# Patient Record
Sex: FEMALE | Race: WHITE | Employment: UNEMPLOYED
[De-identification: names, ages, dates, MRNs, and addresses within clinical notes are randomized per-mention and may not be internally consistent; named-entity substitution may affect disease eponyms.]

---

## 2017-01-13 ENCOUNTER — OFFICE VISIT (OUTPATIENT)
Dept: FAMILY MEDICINE CLINIC | Facility: CLINIC | Age: 57
End: 2017-01-13

## 2017-01-13 VITALS
SYSTOLIC BLOOD PRESSURE: 138 MMHG | TEMPERATURE: 97.4 F | BODY MASS INDEX: 24.07 KG/M2 | DIASTOLIC BLOOD PRESSURE: 103 MMHG | HEIGHT: 64 IN | WEIGHT: 141 LBS | HEART RATE: 120 BPM

## 2017-01-13 DIAGNOSIS — Z12.31 ENCOUNTER FOR MAMMOGRAM TO ESTABLISH BASELINE MAMMOGRAM: ICD-10-CM

## 2017-01-13 DIAGNOSIS — S34.5XXS: ICD-10-CM

## 2017-01-13 DIAGNOSIS — Z13.9 SCREENING: ICD-10-CM

## 2017-01-13 DIAGNOSIS — J44.9 CHRONIC OBSTRUCTIVE PULMONARY DISEASE, UNSPECIFIED COPD TYPE (HCC): ICD-10-CM

## 2017-01-13 DIAGNOSIS — Z76.89 ENCOUNTER TO ESTABLISH CARE: Primary | ICD-10-CM

## 2017-01-13 PROBLEM — S34.5XXA: Status: ACTIVE | Noted: 2017-01-13

## 2017-01-13 PROCEDURE — 99204 OFFICE O/P NEW MOD 45 MIN: CPT | Performed by: NURSE PRACTITIONER

## 2017-01-13 RX ORDER — UBIDECARENONE 75 MG
50 CAPSULE ORAL DAILY
COMMUNITY
End: 2017-06-14 | Stop reason: ALTCHOICE

## 2017-01-13 ASSESSMENT — ENCOUNTER SYMPTOMS
RHINORRHEA: 0
COUGH: 0
SINUS PRESSURE: 0
ABDOMINAL PAIN: 0
DIARRHEA: 0
CONSTIPATION: 0
EYE DISCHARGE: 0
SHORTNESS OF BREATH: 0
BLOOD IN STOOL: 0
CHEST TIGHTNESS: 0

## 2017-01-13 ASSESSMENT — PATIENT HEALTH QUESTIONNAIRE - PHQ9
SUM OF ALL RESPONSES TO PHQ QUESTIONS 1-9: 2
2. FEELING DOWN, DEPRESSED OR HOPELESS: 1
1. LITTLE INTEREST OR PLEASURE IN DOING THINGS: 1
SUM OF ALL RESPONSES TO PHQ9 QUESTIONS 1 & 2: 2

## 2017-01-16 RX ORDER — TRAZODONE HYDROCHLORIDE 150 MG/1
TABLET ORAL
Qty: 30 TABLET | Refills: 1 | Status: SHIPPED | OUTPATIENT
Start: 2017-01-16 | End: 2017-02-22

## 2017-01-16 RX ORDER — GABAPENTIN 300 MG/1
300 CAPSULE ORAL 3 TIMES DAILY
Qty: 90 CAPSULE | Refills: 1 | Status: SHIPPED | OUTPATIENT
Start: 2017-01-16 | End: 2017-03-27 | Stop reason: SDUPTHER

## 2017-01-16 RX ORDER — CYCLOBENZAPRINE HCL 10 MG
TABLET ORAL
Qty: 30 TABLET | Refills: 0 | Status: SHIPPED | OUTPATIENT
Start: 2017-01-16 | End: 2017-02-11 | Stop reason: SDUPTHER

## 2017-01-16 RX ORDER — HYDROXYZINE PAMOATE 25 MG/1
25 CAPSULE ORAL 3 TIMES DAILY PRN
Qty: 30 CAPSULE | Refills: 2 | Status: SHIPPED | OUTPATIENT
Start: 2017-01-16 | End: 2017-03-27 | Stop reason: SDUPTHER

## 2017-01-27 ENCOUNTER — TELEPHONE (OUTPATIENT)
Dept: FAMILY MEDICINE CLINIC | Facility: CLINIC | Age: 57
End: 2017-01-27

## 2017-02-13 RX ORDER — CYCLOBENZAPRINE HCL 10 MG
TABLET ORAL
Qty: 30 TABLET | Refills: 0 | Status: SHIPPED | OUTPATIENT
Start: 2017-02-13 | End: 2017-02-23

## 2017-02-15 ENCOUNTER — TELEPHONE (OUTPATIENT)
Dept: FAMILY MEDICINE CLINIC | Facility: CLINIC | Age: 57
End: 2017-02-15

## 2017-02-22 ENCOUNTER — OFFICE VISIT (OUTPATIENT)
Dept: FAMILY MEDICINE CLINIC | Facility: CLINIC | Age: 57
End: 2017-02-22

## 2017-02-22 ENCOUNTER — HOSPITAL ENCOUNTER (OUTPATIENT)
Age: 57
Discharge: HOME | End: 2017-02-22
Payer: COMMERCIAL

## 2017-02-22 ENCOUNTER — HOSPITAL ENCOUNTER (OUTPATIENT)
Dept: GENERAL RADIOLOGY | Age: 57
Discharge: HOME | End: 2017-02-22
Payer: COMMERCIAL

## 2017-02-22 VITALS
BODY MASS INDEX: 22.74 KG/M2 | OXYGEN SATURATION: 94 % | HEIGHT: 64 IN | SYSTOLIC BLOOD PRESSURE: 111 MMHG | RESPIRATION RATE: 18 BRPM | WEIGHT: 133.2 LBS | HEART RATE: 113 BPM | DIASTOLIC BLOOD PRESSURE: 79 MMHG

## 2017-02-22 DIAGNOSIS — R10.11 RIGHT UPPER QUADRANT ABDOMINAL PAIN: ICD-10-CM

## 2017-02-22 DIAGNOSIS — R10.11 RIGHT UPPER QUADRANT ABDOMINAL PAIN: Primary | ICD-10-CM

## 2017-02-22 PROCEDURE — 99214 OFFICE O/P EST MOD 30 MIN: CPT | Performed by: NURSE PRACTITIONER

## 2017-02-22 PROCEDURE — 74000 XR ABDOMEN LIMITED (KUB): CPT

## 2017-02-22 ASSESSMENT — ENCOUNTER SYMPTOMS
EYE DISCHARGE: 0
CONSTIPATION: 0
SHORTNESS OF BREATH: 0
CHEST TIGHTNESS: 0
ABDOMINAL PAIN: 1
DIARRHEA: 0
BLOOD IN STOOL: 0
COUGH: 1
RHINORRHEA: 1
SINUS PRESSURE: 0

## 2017-02-22 ASSESSMENT — PATIENT HEALTH QUESTIONNAIRE - PHQ9
2. FEELING DOWN, DEPRESSED OR HOPELESS: 0
1. LITTLE INTEREST OR PLEASURE IN DOING THINGS: 0
SUM OF ALL RESPONSES TO PHQ QUESTIONS 1-9: 0
SUM OF ALL RESPONSES TO PHQ9 QUESTIONS 1 & 2: 0

## 2017-02-23 RX ORDER — CYCLOBENZAPRINE HCL 10 MG
TABLET ORAL
Qty: 30 TABLET | Refills: 0 | Status: SHIPPED | OUTPATIENT
Start: 2017-02-23 | End: 2017-03-07 | Stop reason: SDUPTHER

## 2017-03-06 ENCOUNTER — TELEPHONE (OUTPATIENT)
Dept: FAMILY MEDICINE CLINIC | Facility: CLINIC | Age: 57
End: 2017-03-06

## 2017-03-06 RX ORDER — CYCLOBENZAPRINE HCL 10 MG
TABLET ORAL
Qty: 30 TABLET | Refills: 0 | Status: CANCELLED | OUTPATIENT
Start: 2017-03-06

## 2017-03-07 RX ORDER — CYCLOBENZAPRINE HCL 10 MG
TABLET ORAL
Qty: 90 TABLET | Refills: 0 | Status: SHIPPED | OUTPATIENT
Start: 2017-03-07 | End: 2017-04-06 | Stop reason: SDUPTHER

## 2017-03-28 ENCOUNTER — TELEPHONE (OUTPATIENT)
Dept: FAMILY MEDICINE CLINIC | Age: 57
End: 2017-03-28

## 2017-03-28 RX ORDER — GABAPENTIN 300 MG/1
CAPSULE ORAL
Qty: 90 CAPSULE | Refills: 0 | Status: SHIPPED | OUTPATIENT
Start: 2017-03-28 | End: 2017-05-05 | Stop reason: SDUPTHER

## 2017-03-28 RX ORDER — HYDROXYZINE PAMOATE 25 MG/1
CAPSULE ORAL
Qty: 30 CAPSULE | Refills: 1 | Status: SHIPPED | OUTPATIENT
Start: 2017-03-28 | End: 2017-06-14 | Stop reason: SDUPTHER

## 2017-04-06 RX ORDER — CYCLOBENZAPRINE HCL 10 MG
TABLET ORAL
Qty: 90 TABLET | Refills: 0 | Status: SHIPPED | OUTPATIENT
Start: 2017-04-06 | End: 2017-05-10 | Stop reason: SDUPTHER

## 2017-04-11 ENCOUNTER — TELEPHONE (OUTPATIENT)
Dept: FAMILY MEDICINE CLINIC | Age: 57
End: 2017-04-11

## 2017-04-25 DIAGNOSIS — Z13.9 SCREENING: ICD-10-CM

## 2017-04-25 LAB
CONTROL: YES
HEMOCCULT STL QL: POSITIVE

## 2017-04-25 PROCEDURE — 82274 ASSAY TEST FOR BLOOD FECAL: CPT | Performed by: NURSE PRACTITIONER

## 2017-05-02 ENCOUNTER — TELEPHONE (OUTPATIENT)
Dept: FAMILY MEDICINE CLINIC | Age: 57
End: 2017-05-02

## 2017-05-02 DIAGNOSIS — R19.5 POSITIVE FIT (FECAL IMMUNOCHEMICAL TEST): Primary | ICD-10-CM

## 2017-05-09 ENCOUNTER — PATIENT MESSAGE (OUTPATIENT)
Dept: OTHER | Facility: CLINIC | Age: 57
End: 2017-05-09

## 2017-05-12 RX ORDER — CYCLOBENZAPRINE HCL 10 MG
TABLET ORAL
Qty: 90 TABLET | Refills: 0 | Status: SHIPPED | OUTPATIENT
Start: 2017-05-12 | End: 2017-06-14 | Stop reason: SDUPTHER

## 2017-05-12 RX ORDER — CHOLECALCIFEROL (VITAMIN D3) 25 MCG
1 CAPSULE ORAL DAILY
Qty: 30 CAPSULE | Refills: 5 | OUTPATIENT
Start: 2017-05-12

## 2017-05-12 RX ORDER — GABAPENTIN 300 MG/1
CAPSULE ORAL
Qty: 90 CAPSULE | Refills: 0 | Status: SHIPPED | OUTPATIENT
Start: 2017-05-12 | End: 2017-06-14 | Stop reason: SDUPTHER

## 2017-05-12 RX ORDER — UBIDECARENONE 75 MG
50 CAPSULE ORAL DAILY
Qty: 30 TABLET | Refills: 5 | OUTPATIENT
Start: 2017-05-12

## 2017-05-12 RX ORDER — TRAZODONE HYDROCHLORIDE 150 MG/1
TABLET ORAL
Qty: 30 TABLET | Refills: 0 | Status: SHIPPED | OUTPATIENT
Start: 2017-05-12 | End: 2017-06-14 | Stop reason: SDUPTHER

## 2017-05-24 ENCOUNTER — TELEPHONE (OUTPATIENT)
Dept: FAMILY MEDICINE CLINIC | Age: 57
End: 2017-05-24

## 2017-06-14 ENCOUNTER — OFFICE VISIT (OUTPATIENT)
Dept: FAMILY MEDICINE CLINIC | Age: 57
End: 2017-06-14
Payer: COMMERCIAL

## 2017-06-14 VITALS
TEMPERATURE: 98.6 F | WEIGHT: 135.2 LBS | HEIGHT: 64 IN | SYSTOLIC BLOOD PRESSURE: 130 MMHG | DIASTOLIC BLOOD PRESSURE: 80 MMHG | BODY MASS INDEX: 23.08 KG/M2 | HEART RATE: 153 BPM | OXYGEN SATURATION: 94 %

## 2017-06-14 DIAGNOSIS — J42 CHRONIC BRONCHITIS, UNSPECIFIED CHRONIC BRONCHITIS TYPE (HCC): ICD-10-CM

## 2017-06-14 DIAGNOSIS — R05.8 COUGH PRODUCTIVE OF PURULENT SPUTUM: ICD-10-CM

## 2017-06-14 DIAGNOSIS — Z76.0 MEDICATION REFILL: ICD-10-CM

## 2017-06-14 DIAGNOSIS — J44.1 COPD EXACERBATION (HCC): Primary | ICD-10-CM

## 2017-06-14 DIAGNOSIS — H10.33 ACUTE BACTERIAL CONJUNCTIVITIS OF BOTH EYES: ICD-10-CM

## 2017-06-14 PROCEDURE — 99214 OFFICE O/P EST MOD 30 MIN: CPT | Performed by: NURSE PRACTITIONER

## 2017-06-14 RX ORDER — POLYMYXIN B SULFATE AND TRIMETHOPRIM 1; 10000 MG/ML; [USP'U]/ML
1 SOLUTION OPHTHALMIC EVERY 4 HOURS
Qty: 1 BOTTLE | Refills: 0 | Status: SHIPPED | OUTPATIENT
Start: 2017-06-14 | End: 2017-06-24

## 2017-06-14 RX ORDER — TRAZODONE HYDROCHLORIDE 150 MG/1
TABLET ORAL
Qty: 30 TABLET | Refills: 5 | Status: SHIPPED | OUTPATIENT
Start: 2017-06-14 | End: 2017-10-11 | Stop reason: SDUPTHER

## 2017-06-14 RX ORDER — GABAPENTIN 300 MG/1
CAPSULE ORAL
Qty: 90 CAPSULE | Refills: 2 | Status: SHIPPED | OUTPATIENT
Start: 2017-06-14 | End: 2017-09-22 | Stop reason: SDUPTHER

## 2017-06-14 RX ORDER — CYCLOBENZAPRINE HCL 10 MG
TABLET ORAL
Qty: 90 TABLET | Refills: 5 | Status: SHIPPED | OUTPATIENT
Start: 2017-06-14 | End: 2017-11-01 | Stop reason: SDUPTHER

## 2017-06-14 RX ORDER — OMEPRAZOLE 20 MG/1
20 CAPSULE, DELAYED RELEASE ORAL DAILY
Qty: 30 CAPSULE | Refills: 5 | Status: SHIPPED | OUTPATIENT
Start: 2017-06-14 | End: 2017-10-11 | Stop reason: SDUPTHER

## 2017-06-14 RX ORDER — ERGOCALCIFEROL 1.25 MG/1
50000 CAPSULE ORAL WEEKLY
Qty: 4 CAPSULE | Refills: 2 | Status: SHIPPED | OUTPATIENT
Start: 2017-06-14 | End: 2017-09-18 | Stop reason: SDUPTHER

## 2017-06-14 RX ORDER — UBIDECARENONE 75 MG
50 CAPSULE ORAL DAILY
Qty: 30 TABLET | Refills: 3 | Status: SHIPPED | OUTPATIENT
Start: 2017-06-14 | End: 2017-10-11 | Stop reason: SDUPTHER

## 2017-06-14 RX ORDER — AZITHROMYCIN 250 MG/1
TABLET, FILM COATED ORAL
Qty: 1 PACKET | Refills: 0 | Status: SHIPPED | OUTPATIENT
Start: 2017-06-14 | End: 2017-10-11 | Stop reason: ALTCHOICE

## 2017-06-14 RX ORDER — GUAIFENESIN 600 MG/1
600 TABLET, EXTENDED RELEASE ORAL 2 TIMES DAILY
Qty: 30 TABLET | Refills: 1 | Status: SHIPPED | OUTPATIENT
Start: 2017-06-14 | End: 2017-10-11 | Stop reason: ALTCHOICE

## 2017-06-14 RX ORDER — HYDROXYZINE PAMOATE 25 MG/1
CAPSULE ORAL
Qty: 30 CAPSULE | Refills: 5 | Status: SHIPPED | OUTPATIENT
Start: 2017-06-14 | End: 2017-10-11 | Stop reason: SDUPTHER

## 2017-06-14 ASSESSMENT — PATIENT HEALTH QUESTIONNAIRE - PHQ9
1. LITTLE INTEREST OR PLEASURE IN DOING THINGS: 0
2. FEELING DOWN, DEPRESSED OR HOPELESS: 0
SUM OF ALL RESPONSES TO PHQ QUESTIONS 1-9: 0
SUM OF ALL RESPONSES TO PHQ9 QUESTIONS 1 & 2: 0

## 2017-06-14 ASSESSMENT — ENCOUNTER SYMPTOMS
NAUSEA: 1
VOMITING: 1
SHORTNESS OF BREATH: 1
ABDOMINAL PAIN: 0
CHEST TIGHTNESS: 0
DIARRHEA: 0
COUGH: 1
CONSTIPATION: 0
SINUS PRESSURE: 0
EYE DISCHARGE: 0
RHINORRHEA: 1
BLOOD IN STOOL: 0

## 2017-09-18 DIAGNOSIS — Z76.0 MEDICATION REFILL: ICD-10-CM

## 2017-09-19 DIAGNOSIS — J44.1 COPD EXACERBATION (HCC): ICD-10-CM

## 2017-09-19 DIAGNOSIS — Z76.0 MEDICATION REFILL: ICD-10-CM

## 2017-09-19 DIAGNOSIS — R05.8 COUGH PRODUCTIVE OF PURULENT SPUTUM: ICD-10-CM

## 2017-09-19 RX ORDER — ERGOCALCIFEROL 1.25 MG/1
CAPSULE ORAL
Qty: 4 CAPSULE | Refills: 1 | Status: SHIPPED | OUTPATIENT
Start: 2017-09-19 | End: 2017-10-11 | Stop reason: SDUPTHER

## 2017-09-21 ENCOUNTER — TELEPHONE (OUTPATIENT)
Dept: FAMILY MEDICINE CLINIC | Age: 57
End: 2017-09-21

## 2017-09-21 RX ORDER — IPRATROPIUM BROMIDE AND ALBUTEROL SULFATE 2.5; .5 MG/3ML; MG/3ML
3 SOLUTION RESPIRATORY (INHALATION) 3 TIMES DAILY
Qty: 360 ML | Refills: 5 | Status: SHIPPED | OUTPATIENT
Start: 2017-09-21 | End: 2017-10-11 | Stop reason: SDUPTHER

## 2017-09-21 RX ORDER — GABAPENTIN 300 MG/1
CAPSULE ORAL
Qty: 90 CAPSULE | Refills: 2 | OUTPATIENT
Start: 2017-09-21

## 2017-09-22 ENCOUNTER — TELEPHONE (OUTPATIENT)
Dept: FAMILY MEDICINE CLINIC | Age: 57
End: 2017-09-22

## 2017-09-22 DIAGNOSIS — Z76.0 MEDICATION REFILL: ICD-10-CM

## 2017-09-22 RX ORDER — GABAPENTIN 300 MG/1
CAPSULE ORAL
Qty: 90 CAPSULE | Refills: 0 | Status: SHIPPED | OUTPATIENT
Start: 2017-09-22 | End: 2017-10-11 | Stop reason: SDUPTHER

## 2017-10-11 ENCOUNTER — OFFICE VISIT (OUTPATIENT)
Dept: FAMILY MEDICINE CLINIC | Age: 57
End: 2017-10-11
Payer: COMMERCIAL

## 2017-10-11 VITALS
HEIGHT: 64 IN | WEIGHT: 138.8 LBS | SYSTOLIC BLOOD PRESSURE: 139 MMHG | DIASTOLIC BLOOD PRESSURE: 87 MMHG | HEART RATE: 96 BPM | BODY MASS INDEX: 23.7 KG/M2

## 2017-10-11 DIAGNOSIS — Z12.39 SCREENING FOR MALIGNANT NEOPLASM OF BREAST: ICD-10-CM

## 2017-10-11 DIAGNOSIS — J44.1 COPD EXACERBATION (HCC): Primary | ICD-10-CM

## 2017-10-11 DIAGNOSIS — Z76.0 MEDICATION REFILL: ICD-10-CM

## 2017-10-11 DIAGNOSIS — S34.5XXS: ICD-10-CM

## 2017-10-11 DIAGNOSIS — R05.8 COUGH PRODUCTIVE OF PURULENT SPUTUM: ICD-10-CM

## 2017-10-11 DIAGNOSIS — Z71.6 ENCOUNTER FOR SMOKING CESSATION COUNSELING: ICD-10-CM

## 2017-10-11 PROCEDURE — G8484 FLU IMMUNIZE NO ADMIN: HCPCS | Performed by: NURSE PRACTITIONER

## 2017-10-11 PROCEDURE — G8926 SPIRO NO PERF OR DOC: HCPCS | Performed by: NURSE PRACTITIONER

## 2017-10-11 PROCEDURE — 3014F SCREEN MAMMO DOC REV: CPT | Performed by: NURSE PRACTITIONER

## 2017-10-11 PROCEDURE — G8427 DOCREV CUR MEDS BY ELIG CLIN: HCPCS | Performed by: NURSE PRACTITIONER

## 2017-10-11 PROCEDURE — G8420 CALC BMI NORM PARAMETERS: HCPCS | Performed by: NURSE PRACTITIONER

## 2017-10-11 PROCEDURE — 99214 OFFICE O/P EST MOD 30 MIN: CPT | Performed by: NURSE PRACTITIONER

## 2017-10-11 PROCEDURE — 3023F SPIROM DOC REV: CPT | Performed by: NURSE PRACTITIONER

## 2017-10-11 PROCEDURE — G8599 NO ASA/ANTIPLAT THER USE RNG: HCPCS | Performed by: NURSE PRACTITIONER

## 2017-10-11 PROCEDURE — 4004F PT TOBACCO SCREEN RCVD TLK: CPT | Performed by: NURSE PRACTITIONER

## 2017-10-11 PROCEDURE — 3017F COLORECTAL CA SCREEN DOC REV: CPT | Performed by: NURSE PRACTITIONER

## 2017-10-11 RX ORDER — CYCLOBENZAPRINE HCL 10 MG
TABLET ORAL
Qty: 90 TABLET | Refills: 5 | Status: CANCELLED | OUTPATIENT
Start: 2017-10-11

## 2017-10-11 RX ORDER — ACETAMINOPHEN 325 MG/1
650 TABLET ORAL EVERY 6 HOURS PRN
Qty: 120 TABLET | Status: CANCELLED | OUTPATIENT
Start: 2017-10-11

## 2017-10-11 RX ORDER — GABAPENTIN 400 MG/1
CAPSULE ORAL
Qty: 90 CAPSULE | Refills: 1 | Status: SHIPPED | OUTPATIENT
Start: 2017-10-11 | End: 2017-12-15 | Stop reason: SDUPTHER

## 2017-10-11 RX ORDER — GUAIFENESIN 600 MG/1
600 TABLET, EXTENDED RELEASE ORAL 2 TIMES DAILY
Qty: 30 TABLET | Refills: 1 | Status: SHIPPED | OUTPATIENT
Start: 2017-10-11 | End: 2018-05-09 | Stop reason: SDUPTHER

## 2017-10-11 RX ORDER — VARENICLINE TARTRATE 1 MG/1
1 TABLET, FILM COATED ORAL 2 TIMES DAILY
Qty: 60 TABLET | Refills: 2 | Status: SHIPPED | OUTPATIENT
Start: 2017-10-11 | End: 2018-08-24

## 2017-10-11 RX ORDER — TIZANIDINE 4 MG/1
4 TABLET ORAL 3 TIMES DAILY
Qty: 21 TABLET | Refills: 0 | Status: SHIPPED | OUTPATIENT
Start: 2017-10-11 | End: 2018-01-24 | Stop reason: ALTCHOICE

## 2017-10-11 RX ORDER — ERGOCALCIFEROL 1.25 MG/1
CAPSULE ORAL
Qty: 4 CAPSULE | Refills: 5 | Status: SHIPPED | OUTPATIENT
Start: 2017-10-11 | End: 2019-03-13 | Stop reason: SDUPTHER

## 2017-10-11 RX ORDER — TRAZODONE HYDROCHLORIDE 150 MG/1
TABLET ORAL
Qty: 30 TABLET | Refills: 5 | Status: SHIPPED | OUTPATIENT
Start: 2017-10-11 | End: 2018-05-09

## 2017-10-11 RX ORDER — UBIDECARENONE 75 MG
50 CAPSULE ORAL DAILY
Qty: 30 TABLET | Refills: 5 | Status: SHIPPED | OUTPATIENT
Start: 2017-10-11 | End: 2019-03-13 | Stop reason: SDUPTHER

## 2017-10-11 RX ORDER — ACETAMINOPHEN AND CODEINE PHOSPHATE 300; 30 MG/1; MG/1
1 TABLET ORAL 2 TIMES DAILY PRN
Qty: 60 TABLET | Refills: 0 | Status: SHIPPED | OUTPATIENT
Start: 2017-10-11 | End: 2018-01-24 | Stop reason: SDUPTHER

## 2017-10-11 RX ORDER — HYDROXYZINE PAMOATE 25 MG/1
CAPSULE ORAL
Qty: 30 CAPSULE | Refills: 5 | Status: SHIPPED | OUTPATIENT
Start: 2017-10-11 | End: 2018-01-24 | Stop reason: SDUPTHER

## 2017-10-11 RX ORDER — IPRATROPIUM BROMIDE AND ALBUTEROL SULFATE 2.5; .5 MG/3ML; MG/3ML
3 SOLUTION RESPIRATORY (INHALATION) 3 TIMES DAILY
Qty: 360 ML | Refills: 5 | Status: SHIPPED | OUTPATIENT
Start: 2017-10-11

## 2017-10-11 RX ORDER — VARENICLINE TARTRATE 25 MG
KIT ORAL
Qty: 1 EACH | Refills: 0 | Status: SHIPPED | OUTPATIENT
Start: 2017-10-11 | End: 2018-08-24

## 2017-10-11 RX ORDER — OMEPRAZOLE 20 MG/1
20 CAPSULE, DELAYED RELEASE ORAL DAILY
Qty: 30 CAPSULE | Refills: 5 | Status: SHIPPED | OUTPATIENT
Start: 2017-10-11 | End: 2018-01-24 | Stop reason: SDUPTHER

## 2017-10-11 NOTE — PROGRESS NOTES
tablet    varenicline (CHANTIX CONTINUING MONTH ADRIAN) 1 MG tablet   5. Screening for malignant neoplasm of breast  JAMILA DIGITAL SCREEN W CAD BILATERAL   6. Sympathetic nerve injury, sequela  acetaminophen-codeine (TYLENOL #3) 300-30 MG per tablet    External Referral To Pain Clinic    tiZANidine (ZANAFLEX) 4 MG tablet       Plan:      1. COPD exacerbation (HCC)    - tiotropium (SPIRIVA RESPIMAT) 2.5 MCG/ACT AERS inhaler; Inhale 2 puffs into the lungs daily  Dispense: 1 Inhaler; Refill: 5  - ipratropium-albuterol (DUONEB) 0.5-2.5 (3) MG/3ML SOLN nebulizer solution; Inhale 3 mLs into the lungs three times daily  Dispense: 360 mL; Refill: 5  - VENTOLIN  (90 Base) MCG/ACT inhaler; INHALE 2 PUFFS INTO THE LUNGS FOUR TIMES A DAY AS NEEDED  Dispense: 1 Inhaler; Refill: 5    2. Medication refill    - vitamin D (ERGOCALCIFEROL) 59199 units CAPS capsule; TAKE 1 CAPSULE BY MOUTH ONE TIME A WEEK  Dispense: 4 capsule; Refill: 5  - vitamin B-12 (CYANOCOBALAMIN) 100 MCG tablet; Take 0.5 tablets by mouth daily  Dispense: 30 tablet; Refill: 5  - traZODone (DESYREL) 150 MG tablet; TAKE 1 TABLET BY MOUTH AT BEDTIME  Dispense: 30 tablet; Refill: 5  - omeprazole (PRILOSEC) 20 MG delayed release capsule; Take 1 capsule by mouth daily  Dispense: 30 capsule; Refill: 5  - hydrOXYzine (VISTARIL) 25 MG capsule; TAKE 1 CAPSULE BY MOUTH THREE TIMES A DAY AS NEEDED FOR ITCHING  Dispense: 30 capsule; Refill: 5  - gabapentin (NEURONTIN) 400 MG capsule; TAKE 1 CAPSULE BY MOUTH THREE TIMES A DAY  Dispense: 90 capsule; Refill: 1    3. Cough productive of purulent sputum    - tiotropium (SPIRIVA RESPIMAT) 2.5 MCG/ACT AERS inhaler; Inhale 2 puffs into the lungs daily  Dispense: 1 Inhaler; Refill: 5  - guaiFENesin (MUCINEX) 600 MG extended release tablet; Take 1 tablet by mouth 2 times daily  Dispense: 30 tablet;  Refill: 1  - VENTOLIN  (90 Base) MCG/ACT inhaler; INHALE 2 PUFFS INTO THE LUNGS FOUR TIMES A DAY AS NEEDED  Dispense: 1 Inhaler; tablet 30 tablet 5     Sig: TAKE 1 TABLET BY MOUTH AT BEDTIME    omeprazole (PRILOSEC) 20 MG delayed release capsule 30 capsule 5     Sig: Take 1 capsule by mouth daily    tiotropium (SPIRIVA RESPIMAT) 2.5 MCG/ACT AERS inhaler 1 Inhaler 5     Sig: Inhale 2 puffs into the lungs daily    ipratropium-albuterol (DUONEB) 0.5-2.5 (3) MG/3ML SOLN nebulizer solution 360 mL 5     Sig: Inhale 3 mLs into the lungs three times daily    hydrOXYzine (VISTARIL) 25 MG capsule 30 capsule 5     Sig: TAKE 1 CAPSULE BY MOUTH THREE TIMES A DAY AS NEEDED FOR ITCHING    guaiFENesin (MUCINEX) 600 MG extended release tablet 30 tablet 1     Sig: Take 1 tablet by mouth 2 times daily    gabapentin (NEURONTIN) 400 MG capsule 90 capsule 1     Sig: TAKE 1 CAPSULE BY MOUTH THREE TIMES A DAY    VENTOLIN  (90 Base) MCG/ACT inhaler 1 Inhaler 5     Sig: INHALE 2 PUFFS INTO THE LUNGS FOUR TIMES A DAY AS NEEDED    acetaminophen-codeine (TYLENOL #3) 300-30 MG per tablet 60 tablet 0     Sig: Take 1 tablet by mouth 2 times daily as needed for Pain    varenicline (CHANTIX STARTING MONTH PAK) 0.5 MG X 11 & 1 MG X 42 tablet 1 each 0     Sig: Take by mouth.     varenicline (CHANTIX CONTINUING MONTH PAK) 1 MG tablet 60 tablet 2     Sig: Take 1 tablet by mouth 2 times daily    tiZANidine (ZANAFLEX) 4 MG tablet 21 tablet 0     Sig: Take 1 tablet by mouth 3 times daily         Electronically signed by Sonja Hui CNP on 11/5/2017 at 8:14 PM

## 2017-10-11 NOTE — PATIENT INSTRUCTIONS
Migraine Headache: Care Instructions  Your Care Instructions  Migraines are painful, throbbing headaches that often start on one side of the head. They may cause nausea and vomiting and make you sensitive to light, sound, or smell. Without treatment, migraines can last from 4 hours to a few days. Medicines can help prevent migraines or stop them after they have started. Your doctor can help you find which ones work best for you. Follow-up care is a key part of your treatment and safety. Be sure to make and go to all appointments, and call your doctor if you are having problems. It's also a good idea to know your test results and keep a list of the medicines you take. How can you care for yourself at home? · Do not drive if you have taken a prescription pain medicine. · Rest in a quiet, dark room until your headache is gone. Close your eyes, and try to relax or go to sleep. Don't watch TV or read. · Put a cold, moist cloth or cold pack on the painful area for 10 to 20 minutes at a time. Put a thin cloth between the cold pack and your skin. · Use a warm, moist towel or a heating pad set on low to relax tight shoulder and neck muscles. · Have someone gently massage your neck and shoulders. · Take your medicines exactly as prescribed. Call your doctor if you think you are having a problem with your medicine. You will get more details on the specific medicines your doctor prescribes. · Be careful not to take pain medicine more often than the instructions allow. You could get worse or more frequent headaches when the medicine wears off. To prevent migraines  · Keep a headache diary so you can figure out what triggers your headaches. Avoiding triggers may help you prevent headaches. Record when each headache began, how long it lasted, and what the pain was like.  (Was it throbbing, aching, stabbing, or dull?) Write down any other symptoms you had with the headache, such as nausea, flashing lights or dark birth control pills or hormone therapy, talk to your doctor about whether they are triggering your migraines. When should you call for help? Call 911 anytime you think you may need emergency care. For example, call if:  · You have signs of a stroke. These may include:  ¨ Sudden numbness, paralysis, or weakness in your face, arm, or leg, especially on only one side of your body. ¨ Sudden vision changes. ¨ Sudden trouble speaking. ¨ Sudden confusion or trouble understanding simple statements. ¨ Sudden problems with walking or balance. ¨ A sudden, severe headache that is different from past headaches. Call your doctor now or seek immediate medical care if:  · You have new or worse nausea and vomiting. · You have a new or higher fever. · Your headache gets much worse. Watch closely for changes in your health, and be sure to contact your doctor if:  · You are not getting better after 2 days (48 hours). Where can you learn more? Go to https://"[employer].iHandle. org and sign in to your [employer] account. Enter V704 in the [employer] box to learn more about \"Migraine Headache: Care Instructions. \"     If you do not have an account, please click on the \"Sign Up Now\" link. Current as of: October 14, 2016  Content Version: 11.3  © 4359-3519 [employer]. Care instructions adapted under license by [facility] ([facility]). If you have questions about a medical condition or this instruction, always ask your healthcare professional. [place] [address] any warranty or liability for your use of this information.

## 2017-10-11 NOTE — PROGRESS NOTES
Chronic Disease Visit Information    BP Readings from Last 3 Encounters:   06/14/17 130/80   02/22/17 111/79   01/13/17 (!) 138/103          BUN (mg/dL)   Date Value   12/21/2016 16     CREATININE (mg/dL)   Date Value   12/21/2016 0.53     Glucose (mg/dL)   Date Value   12/21/2016 132 (H)            Have you changed or started any medications since your last visit including any over-the-counter medicines, vitamins, or herbal medicines? no   Are you having any side effects from any of your medications? -  no  Have you stopped taking any of your medications? Is so, why? -  no    Have you seen any other physician or provider since your last visit? No  Have you had any other diagnostic tests since your last visit? No  Have you been seen in the emergency room and/or had an admission to a hospital since we last saw you? No  Have you had your annual diabetic retinal (eye) exam? No  Have you had your routine dental cleaning in the past 6 months? no    Have you activated your [employer] account? If not, what are your barriers?  Yes     Patient Care Team:  Linda Verma CNP as PCP - General (Certified Nurse Practitioner)         Medical History Review  Past Medical, Family, and Social History reviewed and does contribute to the patient presenting condition    Health Maintenance   Topic Date Due    Hepatitis C screen  1960    HIV screen  12/26/1973    Cervical cancer screen  12/26/1979    Lipid screen  12/26/1998    Breast cancer screen  12/26/2008    Flu vaccine (1) 01/02/2018 (Originally 9/1/2017)    Pneumococcal med risk (1 of 1 - PPSV23) 02/22/2018 (Originally 12/26/1977)    DTaP/Tdap/Td vaccine (1 - Tdap) 06/14/2018 (Originally 12/26/1977)    Colon Cancer Screen FIT/FOBT  04/25/2018

## 2017-11-01 DIAGNOSIS — Z76.0 MEDICATION REFILL: ICD-10-CM

## 2017-11-01 RX ORDER — CYCLOBENZAPRINE HCL 10 MG
TABLET ORAL
Qty: 90 TABLET | Refills: 5 | Status: SHIPPED | OUTPATIENT
Start: 2017-11-01 | End: 2018-01-24 | Stop reason: SDUPTHER

## 2017-11-01 NOTE — TELEPHONE ENCOUNTER
Requested Prescriptions     Pending Prescriptions Disp Refills    cyclobenzaprine (FLEXERIL) 10 MG tablet 90 tablet 5     Sig: TAKE 1 TABLET BY MOUTH THREE TIMES A DAY AS NEEDED     Next Visit Date:  Future Appointments  Date Time Provider Junior Neal   1/10/2018 4:15 PM Eliceo Forbes, [address] Maintenance   Topic Date Due    Hepatitis C screen  1960    HIV screen  12/26/1973    Cervical cancer screen  12/26/1979    Lipid screen  12/26/1998    Breast cancer screen  12/26/2008    Flu vaccine (1) 01/02/2018 (Originally 9/1/2017)    Pneumococcal med risk (1 of 1 - PPSV23) 02/22/2018 (Originally 12/26/1977)    DTaP/Tdap/Td vaccine (1 - Tdap) 06/14/2018 (Originally 12/26/1977)    Colon Cancer Screen FIT/FOBT  04/25/2018       No results found for: LABA1C          ( goal A1C is < 7)   No results found for: LABMICR  No results found for: LDLCHOLESTEROL, LDLCALC    (goal LDL is <100)   BUN (mg/dL)   Date Value   12/21/2016 16     BP Readings from Last 3 Encounters:   10/11/17 139/87   06/14/17 130/80   02/22/17 111/79          (goal 120/80)    All Future Testing planned in [facility] REVA  Lab Frequency Next Occurrence   Spirometry with bronchodilator Once 01/13/2018   Lipid Panel Once 01/12/2018   Hemoglobin A1C Once 01/13/2018   HIV Screen Once 01/13/2018   Hepatitis C Antibody Once 01/12/2018   TSH with Reflex Once 01/12/2018   JAMILA DIGITAL SCREEN W CAD BILATERAL Once 11/10/2017               Patient Active Problem List:     Carotid artery stenosis     Anxiety     SOB (shortness of breath)     Oxygen desaturation     COPD exacerbation (HCC)     Sympathetic nerve injury

## 2017-11-05 ASSESSMENT — ENCOUNTER SYMPTOMS
BLOOD IN STOOL: 0
ABDOMINAL PAIN: 0
EYE DISCHARGE: 0
NAUSEA: 0
SINUS PRESSURE: 0
CONSTIPATION: 0
RHINORRHEA: 1
COUGH: 1
CHEST TIGHTNESS: 0
VOMITING: 0
SHORTNESS OF BREATH: 1
DIARRHEA: 0

## 2017-12-15 DIAGNOSIS — Z76.0 MEDICATION REFILL: ICD-10-CM

## 2017-12-18 RX ORDER — GABAPENTIN 400 MG/1
CAPSULE ORAL
Qty: 90 CAPSULE | Refills: 0 | Status: SHIPPED | OUTPATIENT
Start: 2017-12-18 | End: 2018-01-24 | Stop reason: SDUPTHER

## 2017-12-18 NOTE — TELEPHONE ENCOUNTER
Requested Prescriptions     Pending Prescriptions Disp Refills    gabapentin (NEURONTIN) 400 MG capsule [Pharmacy Med Name: Gabapentin Oral Capsule 400 MG] 90 capsule 0     Sig: TAKE 1 CAPSULE BY MOUTH THREE TIMES A DAY     Next Visit Date:  Future Appointments  Date Time Provider Junior Neal   1/10/2018 4:15 PM Galen Shelton, [address] Maintenance   Topic Date Due    Hepatitis C screen  1960    HIV screen  12/26/1973    Cervical cancer screen  12/26/1979    Lipid screen  12/26/1998    Breast cancer screen  12/26/2008    Flu vaccine (1) 01/02/2018 (Originally 9/1/2017)    Pneumococcal med risk (1 of 1 - PPSV23) 02/22/2018 (Originally 12/26/1977)    DTaP/Tdap/Td vaccine (1 - Tdap) 06/14/2018 (Originally 12/26/1977)    Colon Cancer Screen FIT/FOBT  04/25/2018       No results found for: LABA1C          ( goal A1C is < 7)   No results found for: LABMICR  No results found for: LDLCHOLESTEROL, LDLCALC    (goal LDL is <100)   BUN (mg/dL)   Date Value   12/21/2016 16     BP Readings from Last 3 Encounters:   10/11/17 139/87   06/14/17 130/80   02/22/17 111/79          (goal 120/80)    All Future Testing planned in [facility] REVA  Lab Frequency Next Occurrence   Spirometry with bronchodilator Once 01/13/2018   Lipid Panel Once 01/12/2018   Hemoglobin A1C Once 01/13/2018   HIV Screen Once 01/13/2018   Hepatitis C Antibody Once 01/12/2018   TSH with Reflex Once 01/12/2018   JAMILA DIGITAL SCREEN W CAD BILATERAL Once 12/31/2017               Patient Active Problem List:     Carotid artery stenosis     Anxiety     SOB (shortness of breath)     Oxygen desaturation     COPD exacerbation (HCC)     Sympathetic nerve injury

## 2018-01-24 ENCOUNTER — OFFICE VISIT (OUTPATIENT)
Dept: FAMILY MEDICINE CLINIC | Age: 58
End: 2018-01-24
Payer: COMMERCIAL

## 2018-01-24 VITALS
HEART RATE: 108 BPM | HEIGHT: 64 IN | DIASTOLIC BLOOD PRESSURE: 74 MMHG | BODY MASS INDEX: 23.8 KG/M2 | SYSTOLIC BLOOD PRESSURE: 129 MMHG | WEIGHT: 139.4 LBS

## 2018-01-24 DIAGNOSIS — J44.9 CHRONIC OBSTRUCTIVE PULMONARY DISEASE, UNSPECIFIED COPD TYPE (HCC): ICD-10-CM

## 2018-01-24 DIAGNOSIS — Z20.2 POTENTIAL EXPOSURE TO STD: Primary | ICD-10-CM

## 2018-01-24 DIAGNOSIS — S34.5XXS: ICD-10-CM

## 2018-01-24 DIAGNOSIS — Z76.0 MEDICATION REFILL: ICD-10-CM

## 2018-01-24 PROCEDURE — 3017F COLORECTAL CA SCREEN DOC REV: CPT | Performed by: NURSE PRACTITIONER

## 2018-01-24 PROCEDURE — G8420 CALC BMI NORM PARAMETERS: HCPCS | Performed by: NURSE PRACTITIONER

## 2018-01-24 PROCEDURE — 3023F SPIROM DOC REV: CPT | Performed by: NURSE PRACTITIONER

## 2018-01-24 PROCEDURE — G8427 DOCREV CUR MEDS BY ELIG CLIN: HCPCS | Performed by: NURSE PRACTITIONER

## 2018-01-24 PROCEDURE — 99213 OFFICE O/P EST LOW 20 MIN: CPT | Performed by: NURSE PRACTITIONER

## 2018-01-24 PROCEDURE — 4004F PT TOBACCO SCREEN RCVD TLK: CPT | Performed by: NURSE PRACTITIONER

## 2018-01-24 PROCEDURE — G8926 SPIRO NO PERF OR DOC: HCPCS | Performed by: NURSE PRACTITIONER

## 2018-01-24 PROCEDURE — G8599 NO ASA/ANTIPLAT THER USE RNG: HCPCS | Performed by: NURSE PRACTITIONER

## 2018-01-24 PROCEDURE — G8484 FLU IMMUNIZE NO ADMIN: HCPCS | Performed by: NURSE PRACTITIONER

## 2018-01-24 PROCEDURE — 3014F SCREEN MAMMO DOC REV: CPT | Performed by: NURSE PRACTITIONER

## 2018-01-24 RX ORDER — HYDROXYZINE PAMOATE 25 MG/1
CAPSULE ORAL
Qty: 30 CAPSULE | Refills: 5 | Status: SHIPPED | OUTPATIENT
Start: 2018-01-24 | End: 2018-05-09 | Stop reason: SDUPTHER

## 2018-01-24 RX ORDER — OMEPRAZOLE 20 MG/1
20 CAPSULE, DELAYED RELEASE ORAL DAILY
Qty: 30 CAPSULE | Refills: 5 | Status: SHIPPED | OUTPATIENT
Start: 2018-01-24 | End: 2018-05-09 | Stop reason: SDUPTHER

## 2018-01-24 RX ORDER — ACETAMINOPHEN AND CODEINE PHOSPHATE 300; 30 MG/1; MG/1
1 TABLET ORAL 2 TIMES DAILY PRN
Qty: 30 TABLET | Refills: 0 | Status: SHIPPED | OUTPATIENT
Start: 2018-01-24 | End: 2018-02-08

## 2018-01-24 RX ORDER — GABAPENTIN 400 MG/1
CAPSULE ORAL
Qty: 90 CAPSULE | Refills: 0 | Status: SHIPPED | OUTPATIENT
Start: 2018-01-24 | End: 2018-03-02 | Stop reason: SDUPTHER

## 2018-01-24 RX ORDER — CYCLOBENZAPRINE HCL 10 MG
TABLET ORAL
Qty: 90 TABLET | Refills: 5 | Status: SHIPPED | OUTPATIENT
Start: 2018-01-24 | End: 2018-05-09 | Stop reason: SDUPTHER

## 2018-01-24 ASSESSMENT — ENCOUNTER SYMPTOMS
ABDOMINAL PAIN: 0
VOMITING: 0
RHINORRHEA: 0
BLOOD IN STOOL: 0
SINUS PRESSURE: 0
DIARRHEA: 0
COUGH: 0
NAUSEA: 0
CHEST TIGHTNESS: 0
CONSTIPATION: 0
SHORTNESS OF BREATH: 1
EYE DISCHARGE: 0

## 2018-01-24 NOTE — PATIENT INSTRUCTIONS
· Eat foods that contain protein so that you do not lose muscle mass. ? · Talk with your doctor if you gain too much weight or if you lose weight without trying. ?Mental health  ? · Talk to your family, friends, or a therapist about your feelings. It is normal to feel frightened, angry, hopeless, helpless, and even guilty. Talking openly about bad feelings can help you cope. If these feelings last, talk to your doctor. When should you call for help? Call 911 anytime you think you may need emergency care. For example, call if:  ? · You have severe trouble breathing. ?Call your doctor now or seek immediate medical care if:  ? · You have new or worse trouble breathing. ? · You cough up blood. ? · You have a fever. ? Watch closely for changes in your health, and be sure to contact your doctor if:  ? · You cough more deeply or more often, especially if you notice more mucus or a change in the color of your mucus. ? · You have new or worse swelling in your legs or belly. ? · You are not getting better as expected. Where can you learn more? Go to https://"2,10E+07"peCommProveeb.[employer]. org and sign in to your [employer] account. Enter S938 in the Lien [employer] box to learn more about \"Chronic Obstructive Pulmonary Disease (COPD): Care Instructions. \"     If you do not have an account, please click on the \"Sign Up Now\" link. Current as of: May 12, 2017  Content Version: 11.5  © 2673-6766 Healthwise, HCDC. Care instructions adapted under license by [facility] ([facility]). If you have questions about a medical condition or this instruction, always ask your healthcare professional. [place] [address] any warranty or liability for your use of this information.
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
## 2018-01-30 ENCOUNTER — TELEPHONE (OUTPATIENT)
Dept: FAMILY MEDICINE CLINIC | Age: 58
End: 2018-01-30

## 2018-03-02 DIAGNOSIS — Z76.0 MEDICATION REFILL: ICD-10-CM

## 2018-03-02 RX ORDER — GABAPENTIN 400 MG/1
CAPSULE ORAL
Qty: 90 CAPSULE | Refills: 0 | Status: SHIPPED | OUTPATIENT
Start: 2018-03-02 | End: 2018-04-11 | Stop reason: SDUPTHER

## 2018-04-11 ENCOUNTER — TELEPHONE (OUTPATIENT)
Dept: FAMILY MEDICINE CLINIC | Age: 58
End: 2018-04-11

## 2018-04-11 DIAGNOSIS — Z76.0 MEDICATION REFILL: ICD-10-CM

## 2018-04-11 RX ORDER — GABAPENTIN 400 MG/1
CAPSULE ORAL
Qty: 90 CAPSULE | Refills: 0 | Status: SHIPPED | OUTPATIENT
Start: 2018-04-11 | End: 2018-05-09 | Stop reason: SDUPTHER

## 2018-05-09 ENCOUNTER — OFFICE VISIT (OUTPATIENT)
Dept: FAMILY MEDICINE CLINIC | Age: 58
End: 2018-05-09
Payer: COMMERCIAL

## 2018-05-09 VITALS
TEMPERATURE: 98.9 F | OXYGEN SATURATION: 92 % | SYSTOLIC BLOOD PRESSURE: 129 MMHG | BODY MASS INDEX: 23.7 KG/M2 | WEIGHT: 138.8 LBS | HEIGHT: 64 IN | HEART RATE: 103 BPM | DIASTOLIC BLOOD PRESSURE: 71 MMHG

## 2018-05-09 DIAGNOSIS — Z76.0 MEDICATION REFILL: ICD-10-CM

## 2018-05-09 DIAGNOSIS — R05.8 POST-VIRAL COUGH SYNDROME: Primary | ICD-10-CM

## 2018-05-09 DIAGNOSIS — S34.5XXS: ICD-10-CM

## 2018-05-09 DIAGNOSIS — R06.2 WHEEZE: ICD-10-CM

## 2018-05-09 PROCEDURE — 4004F PT TOBACCO SCREEN RCVD TLK: CPT | Performed by: NURSE PRACTITIONER

## 2018-05-09 PROCEDURE — G8420 CALC BMI NORM PARAMETERS: HCPCS | Performed by: NURSE PRACTITIONER

## 2018-05-09 PROCEDURE — 99214 OFFICE O/P EST MOD 30 MIN: CPT | Performed by: NURSE PRACTITIONER

## 2018-05-09 PROCEDURE — G8599 NO ASA/ANTIPLAT THER USE RNG: HCPCS | Performed by: NURSE PRACTITIONER

## 2018-05-09 PROCEDURE — G8427 DOCREV CUR MEDS BY ELIG CLIN: HCPCS | Performed by: NURSE PRACTITIONER

## 2018-05-09 PROCEDURE — 3017F COLORECTAL CA SCREEN DOC REV: CPT | Performed by: NURSE PRACTITIONER

## 2018-05-09 RX ORDER — OMEPRAZOLE 20 MG/1
20 CAPSULE, DELAYED RELEASE ORAL DAILY
Qty: 30 CAPSULE | Refills: 5 | Status: SHIPPED | OUTPATIENT
Start: 2018-05-09 | End: 2019-03-13 | Stop reason: SDUPTHER

## 2018-05-09 RX ORDER — GUAIFENESIN 600 MG/1
600 TABLET, EXTENDED RELEASE ORAL 2 TIMES DAILY
Qty: 30 TABLET | Refills: 1 | Status: SHIPPED | OUTPATIENT
Start: 2018-05-09 | End: 2019-12-18

## 2018-05-09 RX ORDER — HYDROXYZINE PAMOATE 25 MG/1
CAPSULE ORAL
Qty: 30 CAPSULE | Refills: 5 | Status: SHIPPED | OUTPATIENT
Start: 2018-05-09 | End: 2019-03-13 | Stop reason: SDUPTHER

## 2018-05-09 RX ORDER — TRAMADOL HYDROCHLORIDE 50 MG/1
TABLET ORAL
Qty: 30 TABLET | Refills: 1 | Status: SHIPPED | OUTPATIENT
Start: 2018-05-09 | End: 2018-06-09

## 2018-05-09 RX ORDER — CYCLOBENZAPRINE HCL 10 MG
TABLET ORAL
Qty: 90 TABLET | Refills: 5 | Status: SHIPPED | OUTPATIENT
Start: 2018-05-09 | End: 2018-11-15 | Stop reason: SDUPTHER

## 2018-05-09 RX ORDER — METHYLPREDNISOLONE 4 MG/1
TABLET ORAL
Qty: 1 KIT | Refills: 0 | Status: SHIPPED | OUTPATIENT
Start: 2018-05-09 | End: 2018-05-15

## 2018-05-09 RX ORDER — GABAPENTIN 400 MG/1
CAPSULE ORAL
Qty: 90 CAPSULE | Refills: 0 | Status: SHIPPED | OUTPATIENT
Start: 2018-05-09 | End: 2018-06-11 | Stop reason: SDUPTHER

## 2018-05-09 ASSESSMENT — ENCOUNTER SYMPTOMS
RHINORRHEA: 0
ABDOMINAL PAIN: 0
SINUS PRESSURE: 0
DIARRHEA: 0
CHEST TIGHTNESS: 0
CONSTIPATION: 0
SHORTNESS OF BREATH: 1
COUGH: 0
BLOOD IN STOOL: 0
EYE DISCHARGE: 0
VOMITING: 0
NAUSEA: 0

## 2018-05-14 ENCOUNTER — TELEPHONE (OUTPATIENT)
Dept: FAMILY MEDICINE CLINIC | Age: 58
End: 2018-05-14

## 2018-06-11 DIAGNOSIS — Z76.0 MEDICATION REFILL: ICD-10-CM

## 2018-06-13 RX ORDER — GABAPENTIN 400 MG/1
CAPSULE ORAL
Qty: 90 CAPSULE | Refills: 0 | Status: SHIPPED | OUTPATIENT
Start: 2018-06-13 | End: 2018-07-12 | Stop reason: SDUPTHER

## 2018-07-12 DIAGNOSIS — Z76.0 MEDICATION REFILL: ICD-10-CM

## 2018-07-13 RX ORDER — GABAPENTIN 400 MG/1
CAPSULE ORAL
Qty: 90 CAPSULE | Refills: 0 | Status: SHIPPED | OUTPATIENT
Start: 2018-07-13 | End: 2018-08-24 | Stop reason: SDUPTHER

## 2018-08-24 ENCOUNTER — OFFICE VISIT (OUTPATIENT)
Dept: FAMILY MEDICINE CLINIC | Age: 58
End: 2018-08-24
Payer: COMMERCIAL

## 2018-08-24 VITALS
HEIGHT: 64 IN | HEART RATE: 102 BPM | DIASTOLIC BLOOD PRESSURE: 74 MMHG | OXYGEN SATURATION: 95 % | WEIGHT: 138 LBS | RESPIRATION RATE: 20 BRPM | SYSTOLIC BLOOD PRESSURE: 117 MMHG | TEMPERATURE: 97.4 F | BODY MASS INDEX: 23.56 KG/M2

## 2018-08-24 DIAGNOSIS — J30.9 ALLERGIC RHINITIS, UNSPECIFIED SEASONALITY, UNSPECIFIED TRIGGER: Primary | ICD-10-CM

## 2018-08-24 DIAGNOSIS — S34.5XXS: ICD-10-CM

## 2018-08-24 DIAGNOSIS — R19.5 POSITIVE FIT (FECAL IMMUNOCHEMICAL TEST): ICD-10-CM

## 2018-08-24 DIAGNOSIS — Z76.0 MEDICATION REFILL: ICD-10-CM

## 2018-08-24 DIAGNOSIS — I65.29 STENOSIS OF CAROTID ARTERY, UNSPECIFIED LATERALITY: ICD-10-CM

## 2018-08-24 PROCEDURE — G8420 CALC BMI NORM PARAMETERS: HCPCS | Performed by: NURSE PRACTITIONER

## 2018-08-24 PROCEDURE — 4004F PT TOBACCO SCREEN RCVD TLK: CPT | Performed by: NURSE PRACTITIONER

## 2018-08-24 PROCEDURE — 99214 OFFICE O/P EST MOD 30 MIN: CPT | Performed by: NURSE PRACTITIONER

## 2018-08-24 PROCEDURE — G8599 NO ASA/ANTIPLAT THER USE RNG: HCPCS | Performed by: NURSE PRACTITIONER

## 2018-08-24 PROCEDURE — G8427 DOCREV CUR MEDS BY ELIG CLIN: HCPCS | Performed by: NURSE PRACTITIONER

## 2018-08-24 PROCEDURE — 3017F COLORECTAL CA SCREEN DOC REV: CPT | Performed by: NURSE PRACTITIONER

## 2018-08-24 RX ORDER — GABAPENTIN 400 MG/1
CAPSULE ORAL
Qty: 90 CAPSULE | Refills: 0 | Status: SHIPPED | OUTPATIENT
Start: 2018-08-24 | End: 2018-09-26 | Stop reason: SDUPTHER

## 2018-08-24 RX ORDER — FLUTICASONE PROPIONATE 50 MCG
2 SPRAY, SUSPENSION (ML) NASAL DAILY
Qty: 1 BOTTLE | Refills: 5 | Status: SHIPPED | OUTPATIENT
Start: 2018-08-24 | End: 2019-03-13 | Stop reason: SDUPTHER

## 2018-08-24 ASSESSMENT — PATIENT HEALTH QUESTIONNAIRE - PHQ9
SUM OF ALL RESPONSES TO PHQ9 QUESTIONS 1 & 2: 1
1. LITTLE INTEREST OR PLEASURE IN DOING THINGS: 0
SUM OF ALL RESPONSES TO PHQ QUESTIONS 1-9: 1
SUM OF ALL RESPONSES TO PHQ QUESTIONS 1-9: 1
2. FEELING DOWN, DEPRESSED OR HOPELESS: 1

## 2018-08-24 ASSESSMENT — ENCOUNTER SYMPTOMS
SHORTNESS OF BREATH: 0
COUGH: 0

## 2018-08-24 NOTE — PROGRESS NOTES
Cardiovascular: Negative for chest pain, palpitations and leg swelling. Objective:     /74 (Site: Left Arm, Position: Sitting, Cuff Size: Medium Adult)   Pulse 102   Temp 97.4 °F (36.3 °C) (Oral)   Resp 20   Ht 5' 4.02\" (1.626 m)   Wt 138 lb (62.6 kg)   LMP  (LMP Unknown)   SpO2 95%   BMI 23.67 kg/m²    Physical Exam   Constitutional: She is oriented to person, place, and time. She appears well-developed and well-nourished. Neck: Normal range of motion. Cardiovascular: Normal rate, regular rhythm and normal heart sounds. Pulmonary/Chest: Effort normal and breath sounds normal.   Neurological: She is alert and oriented to person, place, and time. Psychiatric: She has a normal mood and affect. Assessment/Plan         1. Allergic rhinitis, unspecified seasonality, unspecified trigge  - fluticasone (FLONASE) 50 MCG/ACT nasal spray; 2 sprays by Nasal route daily  Dispense: 1 Bottle; Refill: 5    2. Medication refill    - gabapentin (NEURONTIN) 400 MG capsule; TAKE 1 CAPSULE BY MOUTH THREE TIMES A DAY . Dispense: 90 capsule; Refill: 0    3. Positive FIT (fecal immunochemical test)    - René Pacheco MD, [facility] [address]    4. Stenosis of carotid artery, unspecified laterality    - VL DUP CAROTID BILATERAL; Future    5. Sympathetic nerve injury, sequela    - External Referral To Pain Clinic    RTO if symptoms worsen or fail to improve  Pt agreeable with plan      Patient given educational materials - see patient instructions. Discussed use, benefit, and side effects of prescribed medications. All patient questions answered. Pt voiced understanding. Reviewed health maintenance. Instructed to continue current medications, diet and exercise. 1.  Radha He received counseling on the following healthy behaviors: nutrition, exercise and medication adherence  2. Patient given educational materials when available - see patient instructions when applicable  3.

## 2018-09-25 ENCOUNTER — TELEPHONE (OUTPATIENT)
Dept: FAMILY MEDICINE CLINIC | Age: 58
End: 2018-09-25

## 2018-09-25 NOTE — TELEPHONE ENCOUNTER
Pt called and wants to know the name of the doctor you referred her to for pain management, the doctors name is not on the referral

## 2018-09-25 NOTE — TELEPHONE ENCOUNTER
I suggested she find which pain management doctor she would like to see, she has an external referral.

## 2018-09-26 ENCOUNTER — TELEPHONE (OUTPATIENT)
Dept: FAMILY MEDICINE CLINIC | Age: 58
End: 2018-09-26

## 2018-09-26 DIAGNOSIS — Z76.0 MEDICATION REFILL: ICD-10-CM

## 2018-09-26 NOTE — TELEPHONE ENCOUNTER
Last visit:08/24/2018  Last Med refill:08/24/2018    Next Visit Date:  No future appointments.     Health Maintenance   Topic Date Due    Hepatitis C screen  1960    HIV screen  12/26/1973    Lipid screen  12/26/1998    Breast cancer screen  12/26/2008    Colon Cancer Screen FIT/FOBT  04/25/2018    DTaP/Tdap/Td vaccine (1 - Tdap) 12/01/2018 (Originally 12/26/1977)    Cervical cancer screen  12/01/2018 (Originally 12/26/1979)    Flu vaccine (1) 01/24/2019 (Originally 9/1/2018)    Pneumococcal med risk (1 of 1 - PPSV23) 05/09/2019 (Originally 12/26/1977)    Shingles Vaccine (1 of 2 - 2 Dose Series) 05/09/2019 (Originally 12/26/2008)       No results found for: LABA1C          ( goal A1C is < 7)   No results found for: LABMICR  No results found for: LDLCHOLESTEROL, LDLCALC    (goal LDL is <100)   BUN (mg/dL)   Date Value   12/21/2016 16     BP Readings from Last 3 Encounters:   08/24/18 117/74   05/09/18 129/71   01/24/18 129/74          (goal 120/80)    All Future Testing planned in [facility]  Lab Frequency Next Occurrence   Lipid Panel Once 12/01/2018   Hemoglobin A1C Once 12/01/2018   HIV Screen Once 12/01/2018   Hepatitis C Antibody Once 12/01/2018   TSH with Reflex Once 12/01/2018   JAMILA DIGITAL SCREEN W CAD BILATERAL Once 12/01/2018   Herpes Profile Once 01/01/2019   VL DUP CAROTID BILATERAL Once 11/24/2018               Patient Active Problem List:     Carotid artery stenosis     Anxiety     SOB (shortness of breath)     Oxygen desaturation     COPD exacerbation (HCC)     Sympathetic nerve injury

## 2018-09-28 RX ORDER — GABAPENTIN 400 MG/1
CAPSULE ORAL
Qty: 90 CAPSULE | Refills: 0 | Status: SHIPPED | OUTPATIENT
Start: 2018-09-28 | End: 2018-10-03 | Stop reason: SDUPTHER

## 2018-10-03 DIAGNOSIS — Z76.0 MEDICATION REFILL: ICD-10-CM

## 2018-10-03 NOTE — TELEPHONE ENCOUNTER
Pt can not have meds refilled at [facility]. Pt states she can not walk that far. Medication needs to be resent to [facility] on [address].      Spoke with Eliazar and script was cancelled at [facility]

## 2018-10-05 RX ORDER — GABAPENTIN 400 MG/1
CAPSULE ORAL
Qty: 90 CAPSULE | Refills: 0 | Status: SHIPPED | OUTPATIENT
Start: 2018-10-05 | End: 2018-11-14 | Stop reason: SDUPTHER

## 2018-11-14 DIAGNOSIS — Z76.0 MEDICATION REFILL: ICD-10-CM

## 2018-11-15 ENCOUNTER — TELEPHONE (OUTPATIENT)
Dept: PRIMARY CARE CLINIC | Age: 58
End: 2018-11-15

## 2018-11-15 DIAGNOSIS — Z76.0 MEDICATION REFILL: ICD-10-CM

## 2018-11-15 RX ORDER — GABAPENTIN 400 MG/1
CAPSULE ORAL
Qty: 90 CAPSULE | Refills: 0 | Status: SHIPPED | OUTPATIENT
Start: 2018-11-15 | End: 2018-12-14 | Stop reason: SDUPTHER

## 2018-11-15 RX ORDER — CYCLOBENZAPRINE HCL 10 MG
TABLET ORAL
Qty: 90 TABLET | Refills: 0 | Status: SHIPPED | OUTPATIENT
Start: 2018-11-15 | End: 2018-12-14 | Stop reason: SDUPTHER

## 2018-12-12 ENCOUNTER — TELEPHONE (OUTPATIENT)
Dept: PRIMARY CARE CLINIC | Age: 58
End: 2018-12-12

## 2018-12-12 DIAGNOSIS — Z76.0 MEDICATION REFILL: ICD-10-CM

## 2018-12-12 RX ORDER — GABAPENTIN 400 MG/1
CAPSULE ORAL
Qty: 90 CAPSULE | Refills: 0 | OUTPATIENT
Start: 2018-12-12 | End: 2019-01-18

## 2018-12-12 RX ORDER — CYCLOBENZAPRINE HCL 10 MG
TABLET ORAL
Qty: 90 TABLET | Refills: 0 | OUTPATIENT
Start: 2018-12-12

## 2018-12-14 ENCOUNTER — OFFICE VISIT (OUTPATIENT)
Dept: PRIMARY CARE CLINIC | Age: 58
End: 2018-12-14
Payer: COMMERCIAL

## 2018-12-14 ENCOUNTER — TELEPHONE (OUTPATIENT)
Dept: PRIMARY CARE CLINIC | Age: 58
End: 2018-12-14

## 2018-12-14 VITALS
BODY MASS INDEX: 22.71 KG/M2 | DIASTOLIC BLOOD PRESSURE: 75 MMHG | HEIGHT: 64 IN | SYSTOLIC BLOOD PRESSURE: 124 MMHG | WEIGHT: 133 LBS | HEART RATE: 101 BPM

## 2018-12-14 DIAGNOSIS — Z13.220 SCREENING, LIPID: ICD-10-CM

## 2018-12-14 DIAGNOSIS — Z76.0 MEDICATION REFILL: ICD-10-CM

## 2018-12-14 DIAGNOSIS — Z12.39 SCREENING FOR MALIGNANT NEOPLASM OF BREAST: ICD-10-CM

## 2018-12-14 DIAGNOSIS — R00.2 PALPITATIONS: Primary | ICD-10-CM

## 2018-12-14 DIAGNOSIS — Z11.59 ENCOUNTER FOR HEPATITIS C SCREENING TEST FOR LOW RISK PATIENT: ICD-10-CM

## 2018-12-14 DIAGNOSIS — Z12.11 SCREENING FOR MALIGNANT NEOPLASM OF COLON: ICD-10-CM

## 2018-12-14 DIAGNOSIS — Z11.4 SCREENING FOR HIV (HUMAN IMMUNODEFICIENCY VIRUS): ICD-10-CM

## 2018-12-14 PROCEDURE — G8427 DOCREV CUR MEDS BY ELIG CLIN: HCPCS | Performed by: NURSE PRACTITIONER

## 2018-12-14 PROCEDURE — G8420 CALC BMI NORM PARAMETERS: HCPCS | Performed by: NURSE PRACTITIONER

## 2018-12-14 PROCEDURE — 99214 OFFICE O/P EST MOD 30 MIN: CPT | Performed by: NURSE PRACTITIONER

## 2018-12-14 PROCEDURE — G8599 NO ASA/ANTIPLAT THER USE RNG: HCPCS | Performed by: NURSE PRACTITIONER

## 2018-12-14 PROCEDURE — 82274 ASSAY TEST FOR BLOOD FECAL: CPT | Performed by: NURSE PRACTITIONER

## 2018-12-14 PROCEDURE — 3017F COLORECTAL CA SCREEN DOC REV: CPT | Performed by: NURSE PRACTITIONER

## 2018-12-14 PROCEDURE — 93000 ELECTROCARDIOGRAM COMPLETE: CPT | Performed by: NURSE PRACTITIONER

## 2018-12-14 PROCEDURE — G8484 FLU IMMUNIZE NO ADMIN: HCPCS | Performed by: NURSE PRACTITIONER

## 2018-12-14 PROCEDURE — 4004F PT TOBACCO SCREEN RCVD TLK: CPT | Performed by: NURSE PRACTITIONER

## 2018-12-14 ASSESSMENT — ENCOUNTER SYMPTOMS
CHEST TIGHTNESS: 0
DIARRHEA: 0
COUGH: 0
ABDOMINAL PAIN: 0
SHORTNESS OF BREATH: 0
RHINORRHEA: 0
EYE DISCHARGE: 0
BLOOD IN STOOL: 0
SINUS PRESSURE: 0
CONSTIPATION: 0

## 2018-12-14 NOTE — PROGRESS NOTES
Visit Information    Have you changed or started any medications since your last visit including any over-the-counter medicines, vitamins, or herbal medicines? no   Have you stopped taking any of your medications? Is so, why? -  no  Are you having any side effects from any of your medications? - no    Have you seen any other physician or provider since your last visit?  no   Have you had any other diagnostic tests since your last visit?  no   Have you been seen in the emergency room and/or had an admission in a hospital since we last saw you?  no   Have you had your routine dental cleaning in the past 6 months?  no     Do you have an active MyChart account? If no, what is the barrier?   Yes    Patient Care Team:  DARRIAN Whitley CNP as PCP - General (Certified Nurse Practitioner)  DARRIAN Whitley CNP as PCP - S Attributed Provider    Medical History Review  Past Medical, Family, and Social History reviewed and does contribute to the patient presenting condition    Health Maintenance   Topic Date Due    Hepatitis C screen  1960    HIV screen  12/26/1973    DTaP/Tdap/Td vaccine (1 - Tdap) 12/26/1977    Cervical cancer screen  12/26/1979    Lipid screen  12/26/1998    Breast cancer screen  12/26/2008    Colon Cancer Screen FIT/FOBT  04/25/2018    Flu vaccine (1) 01/24/2019 (Originally 9/1/2018)    Pneumococcal med risk (1 of 1 - PPSV23) 05/09/2019 (Originally 12/26/1977)    Shingles Vaccine (1 of 2 - 2 Dose Series) 05/09/2019 (Originally 12/26/2008)

## 2018-12-14 NOTE — PROGRESS NOTES
[facility]  [address], [address]  [phone]    Mckay Archuleta is a 59 y.o. female who presents today for her  medical conditions/complaintsas noted below. Mckay Archuleta is c/o of Discuss Medications (Pt here to review medications and get refills on what is appropriate); Health Maintenance (Due for Colon Screen, Mammogram, PAP, Hep C, HIV, Lipid, Shingles, Tdap, and Flu. Discuss with pt. Pt declines immunizations.); Palpitations (Pt has had Palpitations for whole life, worsening over last month or so); and Headache (Pt experiencing throbbing pain in base of head down arm and into crown of skull)    HPI:     HPI     Pt report palpitations. States she has had this before. She states she started getting chest pain with the palpitations and then it \"went back to normal\". 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 She does not know who her cardiologist was. She has had an mi in the past. She feels it could be stress related. She states she has been treated with xanax in the past for her palpitations. She feels she is under a moderate amt of stress right now. She is not using her meds consistently- she should have been out of her spiriva in June, discussed this with her, she states she is now using it routinely. She states her insurance is not covering due to her non compliance. She has not gotten her carotid scan yet. Historically lydia comes with seemingly large complaints and then does not follow up on ordered lab work or procedures. Nursing note reviewedand discussed with patient. Patient'smedications, allergies, past medical, surgical, social and family histories werereviewed and updated as appropriate.   Current Outpatient Prescriptions on File Prior to Visit   Medication Sig Dispense Refill    gabapentin (NEURONTIN) 400 MG capsule TAKE 1 CAPSULE BY MOUTH THREE TIMES A DAY . 90 capsule 0    cyclobenzaprine (FLEXERIL) 10 MG tablet TAKE 1 TABLET BY MOUTH THREE TIMES A DAY AS NEEDED 90 tablet 0    fluticasone (FLONASE) 50 MCG/ACT nasal spray 2 sprays by Nasal route daily 1 Bottle 5    guaiFENesin (MUCINEX) 600 MG extended release tablet Take 1 tablet by mouth 2 times daily 30 tablet 1    omeprazole (PRILOSEC) 20 MG delayed release capsule Take 1 capsule by mouth daily 30 capsule 5    hydrOXYzine (VISTARIL) 25 MG capsule TAKE 1 CAPSULE BY MOUTH THREE TIMES A DAY AS NEEDED FOR ITCHING 30 capsule 5    vitamin D (ERGOCALCIFEROL) 53792 units CAPS capsule TAKE 1 CAPSULE BY MOUTH ONE TIME A WEEK 4 capsule 5    vitamin B-12 (CYANOCOBALAMIN) 100 MCG tablet Take 0.5 tablets by mouth daily 30 tablet 5    ipratropium-albuterol (DUONEB) 0.5-2.5 (3) MG/3ML SOLN nebulizer solution Inhale 3 mLs into the lungs three times daily 360 mL 5    acetaminophen (TYLENOL) 325 MG tablet Take 650 mg by mouth every 6 hours as needed for Pain Indications: Pain       No current facility-administered medications on file prior to visit. Past Medical History:   Diagnosis Date    Anxiety     Asthma     Carotid artery stenosis     Lesion    COPD (chronic obstructive pulmonary disease) (HCC)     On home O2     Positive FIT (fecal immunochemical test)     Reflex sympathetic dystrophy of right upper extremity       Past Surgical History:   Procedure Laterality Date    CARDIAC CATHETERIZATION      HYSTERECTOMY      total    TONSILLECTOMY      VASCULAR SURGERY      VEIN SURGERY Right 1997    right small and large jugular veiw removed      Family History   Problem Relation Age of Onset    Heart Disease Other     High Blood Pressure Other     Diabetes Other     Asthma Other     Other Other         multi.  scler.     High Blood Pressure Mother     Diabetes Mother     Cancer Father         lung    Heart Attack Brother         age 58    Heart

## 2018-12-17 RX ORDER — CYCLOBENZAPRINE HCL 10 MG
TABLET ORAL
Qty: 90 TABLET | Refills: 0 | Status: SHIPPED | OUTPATIENT
Start: 2018-12-17 | End: 2019-01-14 | Stop reason: SDUPTHER

## 2018-12-17 RX ORDER — GABAPENTIN 400 MG/1
CAPSULE ORAL
Qty: 90 CAPSULE | Refills: 1 | Status: SHIPPED | OUTPATIENT
Start: 2018-12-17 | End: 2019-02-14 | Stop reason: SDUPTHER

## 2019-01-14 DIAGNOSIS — Z76.0 MEDICATION REFILL: ICD-10-CM

## 2019-01-16 RX ORDER — CYCLOBENZAPRINE HCL 10 MG
TABLET ORAL
Qty: 90 TABLET | Refills: 1 | Status: SHIPPED | OUTPATIENT
Start: 2019-01-16 | End: 2019-03-13 | Stop reason: SDUPTHER

## 2019-02-14 DIAGNOSIS — Z76.0 MEDICATION REFILL: ICD-10-CM

## 2019-02-15 RX ORDER — GABAPENTIN 400 MG/1
CAPSULE ORAL
Qty: 90 CAPSULE | Refills: 0 | Status: SHIPPED | OUTPATIENT
Start: 2019-02-15 | End: 2019-03-13 | Stop reason: SDUPTHER

## 2019-03-13 ENCOUNTER — HOSPITAL ENCOUNTER (OUTPATIENT)
Dept: GENERAL RADIOLOGY | Age: 59
Discharge: HOME | End: 2019-03-15
Payer: COMMERCIAL

## 2019-03-13 ENCOUNTER — OFFICE VISIT (OUTPATIENT)
Dept: PRIMARY CARE CLINIC | Age: 59
End: 2019-03-13
Payer: COMMERCIAL

## 2019-03-13 ENCOUNTER — HOSPITAL ENCOUNTER (OUTPATIENT)
Age: 59
Discharge: HOME | End: 2019-03-15
Payer: COMMERCIAL

## 2019-03-13 VITALS
RESPIRATION RATE: 20 BRPM | OXYGEN SATURATION: 94 % | HEIGHT: 64 IN | WEIGHT: 137 LBS | DIASTOLIC BLOOD PRESSURE: 87 MMHG | BODY MASS INDEX: 23.39 KG/M2 | HEART RATE: 94 BPM | SYSTOLIC BLOOD PRESSURE: 146 MMHG

## 2019-03-13 DIAGNOSIS — R51.9 FACIAL PAIN: Primary | ICD-10-CM

## 2019-03-13 DIAGNOSIS — I10 ESSENTIAL HYPERTENSION: ICD-10-CM

## 2019-03-13 DIAGNOSIS — R51.9 FACIAL PAIN: ICD-10-CM

## 2019-03-13 DIAGNOSIS — J30.9 ALLERGIC RHINITIS, UNSPECIFIED SEASONALITY, UNSPECIFIED TRIGGER: ICD-10-CM

## 2019-03-13 DIAGNOSIS — Z76.0 MEDICATION REFILL: ICD-10-CM

## 2019-03-13 PROCEDURE — 4004F PT TOBACCO SCREEN RCVD TLK: CPT | Performed by: NURSE PRACTITIONER

## 2019-03-13 PROCEDURE — G8427 DOCREV CUR MEDS BY ELIG CLIN: HCPCS | Performed by: NURSE PRACTITIONER

## 2019-03-13 PROCEDURE — 70140 X-RAY EXAM OF FACIAL BONES: CPT

## 2019-03-13 PROCEDURE — G8484 FLU IMMUNIZE NO ADMIN: HCPCS | Performed by: NURSE PRACTITIONER

## 2019-03-13 PROCEDURE — 99214 OFFICE O/P EST MOD 30 MIN: CPT | Performed by: NURSE PRACTITIONER

## 2019-03-13 PROCEDURE — G8599 NO ASA/ANTIPLAT THER USE RNG: HCPCS | Performed by: NURSE PRACTITIONER

## 2019-03-13 PROCEDURE — G8420 CALC BMI NORM PARAMETERS: HCPCS | Performed by: NURSE PRACTITIONER

## 2019-03-13 PROCEDURE — 3017F COLORECTAL CA SCREEN DOC REV: CPT | Performed by: NURSE PRACTITIONER

## 2019-03-13 RX ORDER — FLUTICASONE PROPIONATE 50 MCG
2 SPRAY, SUSPENSION (ML) NASAL DAILY
Qty: 1 BOTTLE | Refills: 5 | Status: SHIPPED | OUTPATIENT
Start: 2019-03-13 | End: 2019-09-25 | Stop reason: SDUPTHER

## 2019-03-13 RX ORDER — GABAPENTIN 400 MG/1
CAPSULE ORAL
Qty: 90 CAPSULE | Refills: 2 | Status: SHIPPED | OUTPATIENT
Start: 2019-03-13 | End: 2019-06-14 | Stop reason: SDUPTHER

## 2019-03-13 RX ORDER — UBIDECARENONE 75 MG
50 CAPSULE ORAL DAILY
Qty: 30 TABLET | Refills: 5 | Status: SHIPPED | OUTPATIENT
Start: 2019-03-13 | End: 2019-12-18 | Stop reason: SDUPTHER

## 2019-03-13 RX ORDER — CYCLOBENZAPRINE HCL 10 MG
TABLET ORAL
Qty: 90 TABLET | Refills: 2 | Status: SHIPPED | OUTPATIENT
Start: 2019-03-13 | End: 2019-06-14 | Stop reason: SDUPTHER

## 2019-03-13 RX ORDER — ERGOCALCIFEROL 1.25 MG/1
CAPSULE ORAL
Qty: 4 CAPSULE | Refills: 5 | Status: SHIPPED | OUTPATIENT
Start: 2019-03-13 | End: 2020-04-30 | Stop reason: SDUPTHER

## 2019-03-13 RX ORDER — HYDROXYZINE PAMOATE 25 MG/1
CAPSULE ORAL
Qty: 30 CAPSULE | Refills: 5 | Status: SHIPPED | OUTPATIENT
Start: 2019-03-13 | End: 2019-12-18 | Stop reason: SDUPTHER

## 2019-03-13 RX ORDER — OMEPRAZOLE 20 MG/1
20 CAPSULE, DELAYED RELEASE ORAL DAILY
Qty: 30 CAPSULE | Refills: 5 | Status: SHIPPED | OUTPATIENT
Start: 2019-03-13 | End: 2019-12-18 | Stop reason: SDUPTHER

## 2019-03-13 ASSESSMENT — ENCOUNTER SYMPTOMS
SINUS PRESSURE: 0
COUGH: 0
DIARRHEA: 0
SHORTNESS OF BREATH: 0
FACIAL SWELLING: 0
RHINORRHEA: 0
EYE DISCHARGE: 0
ABDOMINAL PAIN: 0
BLOOD IN STOOL: 0
CHEST TIGHTNESS: 0
CONSTIPATION: 0

## 2019-03-13 ASSESSMENT — PATIENT HEALTH QUESTIONNAIRE - PHQ9
SUM OF ALL RESPONSES TO PHQ QUESTIONS 1-9: 1
2. FEELING DOWN, DEPRESSED OR HOPELESS: 1
1. LITTLE INTEREST OR PLEASURE IN DOING THINGS: 0
SUM OF ALL RESPONSES TO PHQ QUESTIONS 1-9: 1
SUM OF ALL RESPONSES TO PHQ9 QUESTIONS 1 & 2: 1

## 2019-06-14 ENCOUNTER — OFFICE VISIT (OUTPATIENT)
Dept: PRIMARY CARE CLINIC | Age: 59
End: 2019-06-14
Payer: COMMERCIAL

## 2019-06-14 VITALS
DIASTOLIC BLOOD PRESSURE: 86 MMHG | BODY MASS INDEX: 23.58 KG/M2 | SYSTOLIC BLOOD PRESSURE: 144 MMHG | WEIGHT: 137.4 LBS | HEART RATE: 99 BPM | TEMPERATURE: 99.9 F

## 2019-06-14 DIAGNOSIS — Z76.0 MEDICATION REFILL: ICD-10-CM

## 2019-06-14 DIAGNOSIS — G89.4 CHRONIC PAIN SYNDROME: Primary | ICD-10-CM

## 2019-06-14 DIAGNOSIS — S34.5XXS: ICD-10-CM

## 2019-06-14 DIAGNOSIS — I10 ESSENTIAL HYPERTENSION: ICD-10-CM

## 2019-06-14 DIAGNOSIS — J15.9 COMMUNITY ACQUIRED BACTERIAL PNEUMONIA: ICD-10-CM

## 2019-06-14 PROCEDURE — G8420 CALC BMI NORM PARAMETERS: HCPCS | Performed by: NURSE PRACTITIONER

## 2019-06-14 PROCEDURE — 4004F PT TOBACCO SCREEN RCVD TLK: CPT | Performed by: NURSE PRACTITIONER

## 2019-06-14 PROCEDURE — 99214 OFFICE O/P EST MOD 30 MIN: CPT | Performed by: NURSE PRACTITIONER

## 2019-06-14 PROCEDURE — G8427 DOCREV CUR MEDS BY ELIG CLIN: HCPCS | Performed by: NURSE PRACTITIONER

## 2019-06-14 PROCEDURE — G8599 NO ASA/ANTIPLAT THER USE RNG: HCPCS | Performed by: NURSE PRACTITIONER

## 2019-06-14 PROCEDURE — 3017F COLORECTAL CA SCREEN DOC REV: CPT | Performed by: NURSE PRACTITIONER

## 2019-06-14 RX ORDER — LISINOPRIL 10 MG/1
10 TABLET ORAL DAILY
Qty: 90 TABLET | Refills: 1 | Status: SHIPPED | OUTPATIENT
Start: 2019-06-14 | End: 2019-12-18 | Stop reason: SDUPTHER

## 2019-06-14 RX ORDER — AMITRIPTYLINE HYDROCHLORIDE 25 MG/1
25 TABLET, FILM COATED ORAL NIGHTLY
Qty: 30 TABLET | Refills: 3 | Status: SHIPPED | OUTPATIENT
Start: 2019-06-14 | End: 2019-10-21 | Stop reason: SDUPTHER

## 2019-06-14 RX ORDER — GABAPENTIN 400 MG/1
CAPSULE ORAL
Qty: 90 CAPSULE | Refills: 2 | Status: SHIPPED | OUTPATIENT
Start: 2019-06-14 | End: 2019-09-25 | Stop reason: SDUPTHER

## 2019-06-14 RX ORDER — AZITHROMYCIN 250 MG/1
TABLET, FILM COATED ORAL
Qty: 1 PACKET | Refills: 0 | Status: SHIPPED | OUTPATIENT
Start: 2019-06-14 | End: 2019-06-24

## 2019-06-14 ASSESSMENT — ENCOUNTER SYMPTOMS
COUGH: 0
SHORTNESS OF BREATH: 0

## 2019-06-14 NOTE — TELEPHONE ENCOUNTER
Health Maintenance   Topic Date Due    Hepatitis C screen  1960    Pneumococcal 0-64 years Vaccine (1 of 1 - PPSV23) 12/26/1964    HIV screen  12/26/1973    DTaP/Tdap/Td vaccine (1 - Tdap) 12/26/1977    Cervical cancer screen  12/26/1979    Lipid screen  12/26/1998    Breast cancer screen  12/26/2008    Shingles Vaccine (1 of 2) 12/26/2008    Colon Cancer Screen FIT/FOBT  04/25/2018    Flu vaccine (Season Ended) 09/01/2019             (applicable per patient's age: Cancer Screenings, Depression Screening, Fall Risk Screening, Immunizations)    BUN (mg/dL)   Date Value   12/21/2016 16      (goal A1C is < 7)   (goal LDL is <100) need 30-50% reduction from baseline     BP Readings from Last 3 Encounters:   06/14/19 (!) 144/86   03/13/19 (!) 146/87   12/14/18 124/75    (goal /80)      All Future Testing planned in [facility]:  Lab Frequency Next Occurrence   VL DUP CAROTID BILATERAL Once 06/01/2019   Hepatitis C Antibody Once 06/14/2019   HIV Screen Once 06/14/2019   Lipid Panel Once 06/14/2019   JAMILA DIGITAL SCREEN W OR WO CAD BILATERAL Once 08/14/2019   CBC Once 12/14/2019   Comprehensive Metabolic Panel Once 20/78/3813   TSH Once 12/14/2019   Holter Monitor 24 Hour Once 06/01/2019       Next Visit Date:  Future Appointments   Date Time Provider Junior Neal   9/18/2019  2:00 PM DARRIAN Camacho - CNP [facility]            Patient Active Problem List:     Carotid artery stenosis     Anxiety     SOB (shortness of breath)     Oxygen desaturation     COPD exacerbation (HCC)     Sympathetic nerve injury

## 2019-06-14 NOTE — PROGRESS NOTES
Visit Information    Have you changed or started any medications since your last visit including any over-the-counter medicines, vitamins, or herbal medicines? no   Are you having any side effects from any of your medications? -  no  Have you stopped taking any of your medications? Is so, why? -  no    Have you seen any other physician or provider since your last visit? No  Have you had any other diagnostic tests since your last visit? No  Have you been seen in the emergency room and/or had an admission to a hospital since we last saw you? No  Have you had your routine dental cleaning in the past 6 months? no    Have you activated your [employer] account? If not, what are your barriers?  Yes     Patient Care Team:  DARRIAN Mcallister CNP as PCP - General (Certified Nurse Practitioner)  DARRIAN Mcallister CNP as PCP - [facility]    Medical History Review  Past Medical, Family, and Social History reviewed and does contribute to the patient presenting condition    Health Maintenance   Topic Date Due    Hepatitis C screen  1960    Pneumococcal 0-64 years Vaccine (1 of 1 - PPSV23) 12/26/1964    HIV screen  12/26/1973    DTaP/Tdap/Td vaccine (1 - Tdap) 12/26/1977    Cervical cancer screen  12/26/1979    Lipid screen  12/26/1998    Breast cancer screen  12/26/2008    Shingles Vaccine (1 of 2) 12/26/2008    Colon Cancer Screen FIT/FOBT  04/25/2018    Flu vaccine (Season Ended) 09/01/2019

## 2019-06-14 NOTE — PROGRESS NOTES
[facility]  [address], [address]  [phone]    Hanna Schultz is a 59 y.o. female who presents today for her  medical conditions/complaintsas noted below. Hanna Schultz is c/o of Discuss Medications    HPI:     HPI  Pt is here for refill of gabapentin. She takes this for sympathetic nerve injury. She has not gone to see pain management. Pt reporting pain in both hands and both legs. She states the pain radiates in her legs. It bothers her at night time. She states it is getting worse. She states her whole body hurts. She has taken some tylenol but it is not helping. She cant take motrin. She thinks this is related to her nerve problem from her surgery. This has been occurring for two weeks. She has not gotten her blood work, she states she just cant fast.  She ate at 3 am today. Pt has clear mucus. + cough. + copd. Low grade temp in office today. She does have myalgias. Pt has ahd a positive fit- has not gone to gi. Referral was given last august- discussed risks  Has not gone to get mammo  She still has an open order for her carotid scan. Wt Readings from Last 3 Encounters:   06/14/19 137 lb 6.4 oz (62.3 kg)   03/13/19 137 lb (62.1 kg)   12/14/18 133 lb (60.3 kg)     Nursing note reviewedand discussed with patient. Patient'smedications, allergies, past medical, surgical, social and family histories werereviewed and updated as appropriate.   Current Outpatient Medications on File Prior to Visit   Medication Sig Dispense Refill    fluticasone (FLONASE) 50 MCG/ACT nasal spray 2 sprays by Nasal route daily 1 Bottle 5    hydrOXYzine (VISTARIL) 25 MG capsule TAKE 1 CAPSULE BY MOUTH THREE TIMES A DAY AS NEEDED FOR ITCHING 30 capsule 5    omeprazole (PRILOSEC) 20 MG delayed release capsule Take 1 capsule by mouth daily 30 capsule 5    vitamin B-12 (CYANOCOBALAMIN) 100 MCG tablet Take 0.5 tablets by mouth daily 30 Nsaids Palpitations       Subjective:     Review of Systems   Constitutional: Negative for chills and fever. Respiratory: Negative for cough and shortness of breath. Cardiovascular: Negative for chest pain, palpitations and leg swelling. Objective:     BP (!) 144/86 (Site: Left Upper Arm, Position: Sitting, Cuff Size: Medium Adult)   Pulse 99   Temp 99.9 °F (37.7 °C) (Oral)   Wt 137 lb 6.4 oz (62.3 kg)   LMP  (LMP Unknown)   BMI 23.58 kg/m²    Physical Exam   Constitutional: She is oriented to person, place, and time. She appears well-developed and well-nourished. She is active. HENT:   Right Ear: External ear normal.   Left Ear: External ear normal.   Nose: Nose normal.   Eyes: EOM are normal.   Neck: Normal range of motion and full passive range of motion without pain. Cardiovascular: Normal rate, regular rhythm, S1 normal, S2 normal and normal heart sounds. Pulmonary/Chest: Effort normal. No respiratory distress. She has wheezes. Musculoskeletal: Normal range of motion. She exhibits no deformity. Neurological: She is alert and oriented to person, place, and time. Skin: Skin is warm and dry. Psychiatric: She has a normal mood and affect. Assessment/Plan     1. Chronic pain syndrome  Add amitriptyline.   - gabapentin (NEURONTIN) 400 MG capsule; TAKE ONE CAPSULE BY MOUTH THREE TIMES A DAY  Dispense: 90 capsule; Refill: 2  - amitriptyline (ELAVIL) 25 MG tablet; Take 1 tablet by mouth nightly  Dispense: 30 tablet; Refill: 3    2. Sympathetic nerve injury, sequela      3. Medication refill    - gabapentin (NEURONTIN) 400 MG capsule; TAKE ONE CAPSULE BY MOUTH THREE TIMES A DAY  Dispense: 90 capsule; Refill: 2  - tiotropium (SPIRIVA RESPIMAT) 2.5 MCG/ACT AERS inhaler; Inhale 2 puffs into the lungs daily  Dispense: 1 Inhaler; Refill: 5  - VENTOLIN  (90 Base) MCG/ACT inhaler; INHALE 2 PUFFS INTO THE LUNGS FOUR TIMES A DAY AS NEEDED  Dispense: 1 Inhaler; Refill: 5    4.  [facility] acquired bacterial pneumonia  Notify if no improvement or worsening.   - azithromycin (ZITHROMAX) 250 MG tablet; 2 tablets now then 1 daily until gone. Dispense: 1 packet; Refill: 0    5. Essential hypertension  Add ace  Needs to take daily  Needs to get lab work done. - lisinopril (PRINIVIL;ZESTRIL) 10 MG tablet; Take 1 tablet by mouth daily  Dispense: 90 tablet; Refill: 1    RTO if symptoms worsen or fail to improve  Pt agreeable with plan      Patient given educationalmaterials - see patient instructions. Discussed use, benefit, and side effectsof prescribed medications. All patient questions answered. Pt voiced understanding. Reviewed health maintenance. Instructed to continue current medications, diet andexercise. 1.  Magdalena Guillen received counseling on the following healthy behaviors: nutrition, exercise and medication adherence  2. Patient given educational materials when available - see patient instructionswhen applicable  3. Discussed use, benefit, and side effects of prescribed medications. Barriersto medication compliance addressed. All patient questions answered. Pt voicedunderstanding. 4.  Reviewed prior labs and health maintenance  5. Continuecurrent medications, diet and exercise. Completed Refills   Requested Prescriptions     Signed Prescriptions Disp Refills    gabapentin (NEURONTIN) 400 MG capsule 90 capsule 2     Sig: TAKE ONE CAPSULE BY MOUTH THREE TIMES A DAY    tiotropium (SPIRIVA RESPIMAT) 2.5 MCG/ACT AERS inhaler 1 Inhaler 5     Sig: Inhale 2 puffs into the lungs daily    VENTOLIN  (90 Base) MCG/ACT inhaler 1 Inhaler 5     Sig: INHALE 2 PUFFS INTO THE LUNGS FOUR TIMES A DAY AS NEEDED    amitriptyline (ELAVIL) 25 MG tablet 30 tablet 3     Sig: Take 1 tablet by mouth nightly    azithromycin (ZITHROMAX) 250 MG tablet 1 packet 0     Si tablets now then 1 daily until gone.     lisinopril (PRINIVIL;ZESTRIL) 10 MG tablet 90 tablet 1     Sig: Take 1 tablet by mouth daily Electronically signed by Joy Godoy CNP on 6/27/2019 at 8:49 AM

## 2019-06-18 RX ORDER — CYCLOBENZAPRINE HCL 10 MG
TABLET ORAL
Qty: 90 TABLET | Refills: 1 | Status: SHIPPED | OUTPATIENT
Start: 2019-06-18 | End: 2019-08-16 | Stop reason: SDUPTHER

## 2019-08-07 ENCOUNTER — TELEPHONE (OUTPATIENT)
Dept: PRIMARY CARE CLINIC | Age: 59
End: 2019-08-07
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
## 2019-08-16 DIAGNOSIS — Z76.0 MEDICATION REFILL: ICD-10-CM

## 2019-08-21 RX ORDER — CYCLOBENZAPRINE HCL 10 MG
TABLET ORAL
Qty: 90 TABLET | Refills: 1 | Status: SHIPPED | OUTPATIENT
Start: 2019-08-21 | End: 2019-10-21 | Stop reason: SDUPTHER

## 2019-09-25 ENCOUNTER — OFFICE VISIT (OUTPATIENT)
Dept: PRIMARY CARE CLINIC | Age: 59
End: 2019-09-25
Payer: COMMERCIAL

## 2019-09-25 VITALS
WEIGHT: 135 LBS | SYSTOLIC BLOOD PRESSURE: 121 MMHG | OXYGEN SATURATION: 91 % | HEART RATE: 89 BPM | BODY MASS INDEX: 23.17 KG/M2 | DIASTOLIC BLOOD PRESSURE: 81 MMHG

## 2019-09-25 DIAGNOSIS — J30.9 ALLERGIC RHINITIS, UNSPECIFIED SEASONALITY, UNSPECIFIED TRIGGER: ICD-10-CM

## 2019-09-25 DIAGNOSIS — G89.4 CHRONIC PAIN SYNDROME: Primary | ICD-10-CM

## 2019-09-25 PROCEDURE — G8599 NO ASA/ANTIPLAT THER USE RNG: HCPCS | Performed by: NURSE PRACTITIONER

## 2019-09-25 PROCEDURE — 4004F PT TOBACCO SCREEN RCVD TLK: CPT | Performed by: NURSE PRACTITIONER

## 2019-09-25 PROCEDURE — 3017F COLORECTAL CA SCREEN DOC REV: CPT | Performed by: NURSE PRACTITIONER

## 2019-09-25 PROCEDURE — G8420 CALC BMI NORM PARAMETERS: HCPCS | Performed by: NURSE PRACTITIONER

## 2019-09-25 PROCEDURE — G8427 DOCREV CUR MEDS BY ELIG CLIN: HCPCS | Performed by: NURSE PRACTITIONER

## 2019-09-25 PROCEDURE — 99213 OFFICE O/P EST LOW 20 MIN: CPT | Performed by: NURSE PRACTITIONER

## 2019-09-25 RX ORDER — FLUTICASONE PROPIONATE 50 MCG
2 SPRAY, SUSPENSION (ML) NASAL DAILY
Qty: 1 BOTTLE | Refills: 5 | Status: SHIPPED | OUTPATIENT
Start: 2019-09-25 | End: 2019-12-18 | Stop reason: SDUPTHER

## 2019-09-25 RX ORDER — GABAPENTIN 400 MG/1
CAPSULE ORAL
Qty: 90 CAPSULE | Refills: 2 | Status: SHIPPED | OUTPATIENT
Start: 2019-09-25 | End: 2019-12-18 | Stop reason: SDUPTHER

## 2019-09-25 ASSESSMENT — ENCOUNTER SYMPTOMS
COUGH: 0
SHORTNESS OF BREATH: 0

## 2019-09-25 NOTE — PROGRESS NOTES
ipratropium-albuterol (DUONEB) 0.5-2.5 (3) MG/3ML SOLN nebulizer solution Inhale 3 mLs into the lungs three times daily (Patient not taking: Reported on 9/25/2019) 360 mL 5     No current facility-administered medications on file prior to visit. Past Medical History:   Diagnosis Date    Anxiety     Asthma     Carotid artery stenosis     Lesion    COPD [facility]chronic obstructive pulmonary disease) ([facility]     On home O2     Positive FIT (fecal immunochemical test)     Reflex sympathetic dystrophy of right upper extremity       Past Surgical History:   Procedure Laterality Date    CARDIAC CATHETERIZATION      HYSTERECTOMY      total    TONSILLECTOMY      VASCULAR SURGERY      VEIN SURGERY Right 1997    right small and large jugular veiw removed      Family History   Problem Relation Age of Onset    Heart Disease Other     High Blood Pressure Other     Diabetes Other     Asthma Other     Other Other         multi.  scler.  High Blood Pressure Mother     Diabetes Mother     Cancer Father         lung    Heart Attack Brother         age 61    Heart Attack Maternal Grandmother     Heart Disease Brother     Heart Disease Brother      Social History     Tobacco Use    Smoking status: Current Every Day Smoker     Packs/day: 0.50     Years: 35.00     Pack years: 17.50    Smokeless tobacco: Never Used    Tobacco comment: 10 per day   Substance Use Topics    Alcohol use: No      Allergies   Allergen Reactions    Aspirin      Ringing of ears    Cymbalta [Duloxetine Hcl]     Dye [Barium-Containing Compounds]     Morphine     Motrin [Ibuprofen Micronized]     Aluminum-Containing Compounds Swelling    Nsaids Palpitations       Subjective:     Review of Systems   Constitutional: Negative for chills and fever. Respiratory: Negative for cough and shortness of breath. Cardiovascular: Negative for chest pain, palpitations and leg swelling.      Objective:     /81 (Site: Left Upper Arm, Position: Sitting, Cuff Size: Medium Adult)   Pulse 89   Wt 135 lb (61.2 kg)   LMP  (LMP Unknown)   SpO2 91%   Breastfeeding? No   BMI 23.17 kg/m²    Physical Exam   Constitutional: She is oriented to person, place, and time. She appears well-developed and well-nourished. She is active. HENT:   Right Ear: External ear normal.   Left Ear: External ear normal.   Nose: Nose normal.   Eyes: EOM are normal.   Neck: Normal range of motion and full passive range of motion without pain. Cardiovascular: Normal rate, regular rhythm, S1 normal, S2 normal and normal heart sounds. Pulmonary/Chest: Effort normal and breath sounds normal. No respiratory distress. Musculoskeletal: Normal range of motion. She exhibits no deformity. Neurological: She is alert and oriented to person, place, and time. Skin: Skin is warm and dry. Psychiatric: She has a normal mood and affect. Assessment/Plan         1. Chronic pain syndrome    - gabapentin (NEURONTIN) 400 MG capsule; TAKE ONE CAPSULE BY MOUTH THREE TIMES A DAY  Dispense: 90 capsule; Refill: 2    2. Allergic rhinitis, unspecified seasonality, unspecified trigger  Med refill  - fluticasone (FLONASE) 50 MCG/ACT nasal spray; 2 sprays by Nasal route daily  Dispense: 1 Bottle; Refill: 5    RTO if symptoms worsen or fail to improve  Pt agreeable with plan      Patient given educationalmaterials - see patient instructions. Discussed use, benefit, and side effectsof prescribed medications. All patient questions answered. Pt voiced understanding. Reviewed health maintenance. Instructed to continue current medications, diet andexercise. 1.  Eulis Pilling received counseling on the following healthy behaviors: nutrition, exercise and medication adherence  2. Patient given educational materials when available - see patient instructionswhen applicable  3. Discussed use, benefit, and side effects of prescribed medications. Barriersto medication compliance addressed.   All

## 2019-10-21 DIAGNOSIS — G89.4 CHRONIC PAIN SYNDROME: ICD-10-CM

## 2019-10-21 DIAGNOSIS — Z76.0 MEDICATION REFILL: ICD-10-CM

## 2019-10-21 RX ORDER — CYCLOBENZAPRINE HCL 10 MG
TABLET ORAL
Qty: 90 TABLET | Refills: 0 | Status: SHIPPED | OUTPATIENT
Start: 2019-10-21 | End: 2019-11-20 | Stop reason: SDUPTHER

## 2019-10-21 RX ORDER — AMITRIPTYLINE HYDROCHLORIDE 25 MG/1
TABLET, FILM COATED ORAL
Qty: 30 TABLET | Refills: 2 | Status: SHIPPED | OUTPATIENT
Start: 2019-10-21 | End: 2019-12-18 | Stop reason: SDUPTHER

## 2019-12-18 ENCOUNTER — OFFICE VISIT (OUTPATIENT)
Dept: PRIMARY CARE CLINIC | Age: 59
End: 2019-12-18
Payer: COMMERCIAL

## 2019-12-18 VITALS
SYSTOLIC BLOOD PRESSURE: 104 MMHG | TEMPERATURE: 97.5 F | WEIGHT: 135 LBS | BODY MASS INDEX: 23.17 KG/M2 | OXYGEN SATURATION: 93 % | HEART RATE: 83 BPM | DIASTOLIC BLOOD PRESSURE: 68 MMHG

## 2019-12-18 DIAGNOSIS — Z12.31 ENCOUNTER FOR SCREENING MAMMOGRAM FOR BREAST CANCER: ICD-10-CM

## 2019-12-18 DIAGNOSIS — Z76.0 MEDICATION REFILL: ICD-10-CM

## 2019-12-18 DIAGNOSIS — I10 ESSENTIAL HYPERTENSION: ICD-10-CM

## 2019-12-18 DIAGNOSIS — Z72.89 OTHER PROBLEMS RELATED TO LIFESTYLE: ICD-10-CM

## 2019-12-18 DIAGNOSIS — Z11.4 SCREENING FOR HIV WITHOUT PRESENCE OF RISK FACTORS: ICD-10-CM

## 2019-12-18 DIAGNOSIS — Z13.220 SCREENING FOR HYPERLIPIDEMIA: ICD-10-CM

## 2019-12-18 DIAGNOSIS — G89.4 CHRONIC PAIN SYNDROME: ICD-10-CM

## 2019-12-18 DIAGNOSIS — Z12.11 SCREENING FOR COLON CANCER: ICD-10-CM

## 2019-12-18 DIAGNOSIS — J30.9 ALLERGIC RHINITIS, UNSPECIFIED SEASONALITY, UNSPECIFIED TRIGGER: ICD-10-CM

## 2019-12-18 PROCEDURE — G8427 DOCREV CUR MEDS BY ELIG CLIN: HCPCS | Performed by: NURSE PRACTITIONER

## 2019-12-18 PROCEDURE — G8599 NO ASA/ANTIPLAT THER USE RNG: HCPCS | Performed by: NURSE PRACTITIONER

## 2019-12-18 PROCEDURE — 4004F PT TOBACCO SCREEN RCVD TLK: CPT | Performed by: NURSE PRACTITIONER

## 2019-12-18 PROCEDURE — G8484 FLU IMMUNIZE NO ADMIN: HCPCS | Performed by: NURSE PRACTITIONER

## 2019-12-18 PROCEDURE — 99214 OFFICE O/P EST MOD 30 MIN: CPT | Performed by: NURSE PRACTITIONER

## 2019-12-18 PROCEDURE — 3017F COLORECTAL CA SCREEN DOC REV: CPT | Performed by: NURSE PRACTITIONER

## 2019-12-18 PROCEDURE — G8420 CALC BMI NORM PARAMETERS: HCPCS | Performed by: NURSE PRACTITIONER

## 2019-12-18 RX ORDER — HYDROXYZINE PAMOATE 25 MG/1
CAPSULE ORAL
Qty: 30 CAPSULE | Refills: 5 | Status: SHIPPED | OUTPATIENT
Start: 2019-12-18 | End: 2020-06-30 | Stop reason: SDUPTHER

## 2019-12-18 RX ORDER — FLUTICASONE PROPIONATE 50 MCG
2 SPRAY, SUSPENSION (ML) NASAL DAILY
Qty: 1 BOTTLE | Refills: 5 | Status: SHIPPED | OUTPATIENT
Start: 2019-12-18 | End: 2020-06-30 | Stop reason: SDUPTHER

## 2019-12-18 RX ORDER — GABAPENTIN 400 MG/1
CAPSULE ORAL
Qty: 90 CAPSULE | Refills: 2 | Status: SHIPPED | OUTPATIENT
Start: 2019-12-18 | End: 2020-04-30 | Stop reason: SDUPTHER

## 2019-12-18 RX ORDER — OMEPRAZOLE 20 MG/1
20 CAPSULE, DELAYED RELEASE ORAL DAILY
Qty: 30 CAPSULE | Refills: 5 | Status: SHIPPED | OUTPATIENT
Start: 2019-12-18 | End: 2020-06-30 | Stop reason: SDUPTHER

## 2019-12-18 RX ORDER — LISINOPRIL 10 MG/1
10 TABLET ORAL DAILY
Qty: 90 TABLET | Refills: 1 | Status: SHIPPED | OUTPATIENT
Start: 2019-12-18 | End: 2020-06-26 | Stop reason: SDUPTHER

## 2019-12-18 RX ORDER — AMITRIPTYLINE HYDROCHLORIDE 25 MG/1
TABLET, FILM COATED ORAL
Qty: 30 TABLET | Refills: 5 | Status: SHIPPED | OUTPATIENT
Start: 2019-12-18 | End: 2020-06-30 | Stop reason: SDUPTHER

## 2019-12-18 RX ORDER — UBIDECARENONE 75 MG
50 CAPSULE ORAL DAILY
Qty: 30 TABLET | Refills: 5 | Status: SHIPPED | OUTPATIENT
Start: 2019-12-18 | End: 2020-04-30 | Stop reason: SDUPTHER

## 2020-03-05 ENCOUNTER — TELEPHONE (OUTPATIENT)
Dept: PRIMARY CARE CLINIC | Age: 60
End: 2020-03-05

## 2020-03-05 NOTE — LETTER
[facility]  [address] [facility] [address]  Dept: [address], APRN - CNP   3/5/2020      Kapil Ye  [address] [place] [address]          Dear [facility],    [facility] records show that you are in need of a screening mammogram.     The American Cancer Society's guidelines state that early detection of breast cancer improves the chances that breast cancer can be diagnosed at an early stage and treated successfully. Women age 36 and older should have a mammogram every year and should continue to do so for as long as they are in good health. Please call our office to discuss scheduling your mammogram at your earliest convenience. We look forward to hearing from you. If you already had a mammogram this year Congratulations for taking the step toward good health! Please call our office so that we can add this information to your chart. We also welcome you to reach out to us online using [facility] [employer]P. Ask us how!     Sincerely,     DARRIAN Gastelum - CNP

## 2020-04-10 ENCOUNTER — TELEPHONE (OUTPATIENT)
Dept: PRIMARY CARE CLINIC | Age: 60
End: 2020-04-10

## 2020-04-21 RX ORDER — GABAPENTIN 400 MG/1
CAPSULE ORAL
Qty: 90 CAPSULE | Refills: 1 | OUTPATIENT
Start: 2020-04-21

## 2020-04-22 NOTE — TELEPHONE ENCOUNTER
Last visit: 12-18-19  Last Med refill: 03-16-20  Does patient have enough medication for 72 hours: No     Next Visit Date:  Future Appointments   Date Time Provider Junior Neal   6/3/2020  1:00 PM DARRIAN Lynn - CNP [facility] [address] Maintenance   Topic Date Due    Hepatitis C screen  1960    Pneumococcal 0-64 years Vaccine (1 of 1 - PPSV23) 12/26/1964    HIV screen  12/26/1973    DTaP/Tdap/Td vaccine (1 - Tdap) 12/26/1977    Cervical cancer screen  12/26/1979    Lipid screen  12/26/1998    Breast cancer screen  12/26/2008    Potassium monitoring  12/21/2017    Creatinine monitoring  12/21/2017    Colon Cancer Screen FIT/FOBT  04/25/2018    Flu vaccine (Season Ended) 09/25/2020 (Originally 9/1/2020)    Shingles Vaccine (1 of 2) 09/25/2020 (Originally 12/26/2008)    Hepatitis A vaccine  Aged Out    Hepatitis B vaccine  Aged Out    Hib vaccine  Aged Out    Meningococcal (ACWY) vaccine  Aged Out       No results found for: LABA1C          ( goal A1C is < 7)   No results found for: LABMICR  No results found for: LDLCHOLESTEROL, LDLCALC    (goal LDL is <100)   BUN (mg/dL)   Date Value   12/21/2016 16     BP Readings from Last 3 Encounters:   12/18/19 104/68   09/25/19 121/81   06/14/19 (!) 144/86          (goal 120/80)    All Future Testing planned in [facility]  Lab Frequency Next Occurrence   JAMILA Digital Screen Bilateral [ODI7212] Once 05/04/2020   POCT FECAL IMMUNOCHEMICAL TEST (FIT) Once 04/20/2020   Lipid Panel Once 04/30/2020   HIV Screen Once 04/30/2020   Hepatitis C Antibody Once 04/30/2020               Patient Active Problem List:     Carotid artery stenosis     Anxiety     SOB (shortness of breath)     Oxygen desaturation     COPD exacerbation (HCC)     Sympathetic nerve injury

## 2020-04-23 RX ORDER — GABAPENTIN 400 MG/1
CAPSULE ORAL
Qty: 90 CAPSULE | Refills: 2 | OUTPATIENT
Start: 2020-04-23 | End: 2021-07-25

## 2020-04-30 ENCOUNTER — VIRTUAL VISIT (OUTPATIENT)
Dept: FAMILY MEDICINE CLINIC | Age: 60
End: 2020-04-30
Payer: COMMERCIAL

## 2020-04-30 PROCEDURE — 99421 OL DIG E/M SVC 5-10 MIN: CPT | Performed by: NURSE PRACTITIONER

## 2020-04-30 RX ORDER — UBIDECARENONE 75 MG
50 CAPSULE ORAL DAILY
Qty: 30 TABLET | Refills: 5 | Status: SHIPPED | OUTPATIENT
Start: 2020-04-30 | End: 2020-12-29 | Stop reason: SDUPTHER

## 2020-04-30 RX ORDER — GABAPENTIN 400 MG/1
CAPSULE ORAL
Qty: 90 CAPSULE | Refills: 2 | Status: SHIPPED | OUTPATIENT
Start: 2020-04-30 | End: 2020-07-31

## 2020-04-30 RX ORDER — ERGOCALCIFEROL 1.25 MG/1
CAPSULE ORAL
Qty: 4 CAPSULE | Refills: 5 | Status: SHIPPED | OUTPATIENT
Start: 2020-04-30 | End: 2020-12-29 | Stop reason: SDUPTHER

## 2020-04-30 ASSESSMENT — PATIENT HEALTH QUESTIONNAIRE - PHQ9
SUM OF ALL RESPONSES TO PHQ9 QUESTIONS 1 & 2: 0
SUM OF ALL RESPONSES TO PHQ QUESTIONS 1-9: 0
1. LITTLE INTEREST OR PLEASURE IN DOING THINGS: 0
SUM OF ALL RESPONSES TO PHQ QUESTIONS 1-9: 0
2. FEELING DOWN, DEPRESSED OR HOPELESS: 0

## 2020-04-30 ASSESSMENT — ENCOUNTER SYMPTOMS
SHORTNESS OF BREATH: 0
COUGH: 0

## 2020-04-30 NOTE — PROGRESS NOTES
mouth daily Yes DARRIAN Prather CNP   vitamin B-12 (CYANOCOBALAMIN) 100 MCG tablet Take 0.5 tablets by mouth daily Yes DARRIAN Prather CNP   amitriptyline (ELAVIL) 25 MG tablet TAKE ONE TABLET BY MOUTH ONCE NIGHTLY Yes DARRIAN Prather CNP   cyclobenzaprine (FLEXERIL) 10 MG tablet TAKE ONE TABLET BY MOUTH THREE TIMES A DAY AS NEEDED Yes DARRIAN Prather CNP   vitamin D (ERGOCALCIFEROL) 84451 units CAPS capsule TAKE 1 CAPSULE BY MOUTH ONE TIME A WEEK Yes DARRIAN Prather CNP   ipratropium-albuterol (DUONEB) 0.5-2.5 (3) MG/3ML SOLN nebulizer solution Inhale 3 mLs into the lungs three times daily Yes DARRIAN Prather CNP   acetaminophen (TYLENOL) 325 MG tablet Take 650 mg by mouth every 6 hours as needed for Pain Indications: Pain  Historical Provider, MD     Social- patient smokes    Past Medical History:   Diagnosis Date    Anxiety     Asthma     Carotid artery stenosis     Lesion    COPD [facility]chronic obstructive pulmonary disease) ([facility] [address]     On home O2     Positive FIT (fecal immunochemical test)     Reflex sympathetic dystrophy of right upper extremity    ,   Past Surgical History:   Procedure Laterality Date    CARDIAC CATHETERIZATION      HYSTERECTOMY      total    TONSILLECTOMY      VASCULAR SURGERY      VEIN SURGERY Right 1997    right small and large jugular veiw removed              [] OTHER:    Constitutional: [x] Appears well-developed and well-nourished [] No apparent distress                            [] Abnormal-   Mental status  [x] Alert and awake  [x] Oriented to person/place/time [x]Able to follow commands       Eyes:  EOM    []  Normal  [] Abnormal-  Sclera  []  Normal  [] Abnormal -         Discharge []  None visible  [] Abnormal -     HENT:   [] Normocephalic, atraumatic.   [] Abnormal   [] Mouth/Throat: Mucous membranes are moist.      External Ears [] Normal  [] Abnormal-      Neck: [] No visualized mass

## 2020-05-27 RX ORDER — CYCLOBENZAPRINE HCL 10 MG
TABLET ORAL
Qty: 90 TABLET | Refills: 5 | Status: SHIPPED | OUTPATIENT
Start: 2020-05-27 | End: 2020-12-04

## 2020-06-23 ENCOUNTER — TELEPHONE (OUTPATIENT)
Dept: FAMILY MEDICINE CLINIC | Age: 60
End: 2020-06-23

## 2020-06-23 NOTE — LETTER
[address] [facility] [address]  Phone: [phone]  Fax: [phone]    DARRIAN Gastelum CNP        July 6, 2020     Patient: Chris Dash   YOB: 1960   Date of Visit: 6/23/2020       To Whom It May Concern: It is my medical opinion that Redd Cevallos should have her oxygen discontinued, she is no longer using it. .    If you have any questions or concerns, please don't hesitate to call.     Sincerely,        DARRIAN Gastelum CNP

## 2020-06-26 DIAGNOSIS — I10 ESSENTIAL HYPERTENSION: ICD-10-CM

## 2020-06-26 RX ORDER — LISINOPRIL 10 MG/1
10 TABLET ORAL DAILY
Qty: 30 TABLET | Refills: 0 | Status: SHIPPED | OUTPATIENT
Start: 2020-06-26 | End: 2020-06-30 | Stop reason: SDUPTHER

## 2020-06-30 ENCOUNTER — TELEMEDICINE (OUTPATIENT)
Dept: FAMILY MEDICINE CLINIC | Age: 60
End: 2020-06-30
Payer: COMMERCIAL

## 2020-06-30 PROCEDURE — 99214 OFFICE O/P EST MOD 30 MIN: CPT | Performed by: NURSE PRACTITIONER

## 2020-06-30 RX ORDER — FLUTICASONE PROPIONATE 50 MCG
2 SPRAY, SUSPENSION (ML) NASAL DAILY
Qty: 1 BOTTLE | Refills: 5 | Status: SHIPPED | OUTPATIENT
Start: 2020-06-30 | End: 2020-12-29 | Stop reason: SDUPTHER

## 2020-06-30 RX ORDER — HYDROXYZINE PAMOATE 25 MG/1
CAPSULE ORAL
Qty: 30 CAPSULE | Refills: 5 | Status: SHIPPED | OUTPATIENT
Start: 2020-06-30 | End: 2020-12-29 | Stop reason: SDUPTHER

## 2020-06-30 RX ORDER — LISINOPRIL 10 MG/1
10 TABLET ORAL DAILY
Qty: 30 TABLET | Refills: 5 | Status: SHIPPED | OUTPATIENT
Start: 2020-06-30 | End: 2020-12-29 | Stop reason: SDUPTHER

## 2020-06-30 RX ORDER — OMEPRAZOLE 20 MG/1
20 CAPSULE, DELAYED RELEASE ORAL DAILY
Qty: 30 CAPSULE | Refills: 5 | Status: SHIPPED | OUTPATIENT
Start: 2020-06-30 | End: 2020-12-29 | Stop reason: SDUPTHER

## 2020-06-30 RX ORDER — AMITRIPTYLINE HYDROCHLORIDE 25 MG/1
TABLET, FILM COATED ORAL
Qty: 30 TABLET | Refills: 5 | Status: SHIPPED | OUTPATIENT
Start: 2020-06-30 | End: 2020-12-29 | Stop reason: SDUPTHER

## 2020-06-30 ASSESSMENT — ENCOUNTER SYMPTOMS
COUGH: 0
SHORTNESS OF BREATH: 0

## 2020-06-30 NOTE — PROGRESS NOTES
daily Yes DARRIAN Baez CNP   amitriptyline (ELAVIL) 25 MG tablet TAKE ONE TABLET BY MOUTH ONCE NIGHTLY Yes DARRIAN Baez CNP   omeprazole (PRILOSEC) 20 MG delayed release capsule Take 1 capsule by mouth daily Yes DARRIAN Baez CNP   hydrOXYzine (VISTARIL) 25 MG capsule TAKE 1 CAPSULE BY MOUTH THREE TIMES A DAY AS NEEDED FOR ITCHING Yes DARRIAN Baez CNP   VENTOLIN  (90 Base) MCG/ACT inhaler INHALE 2 PUFFS INTO THE LUNGS FOUR TIMES A DAY AS NEEDED Yes DARRIAN Baez CNP   tiotropium (SPIRIVA RESPIMAT) 2.5 MCG/ACT AERS inhaler Inhale 2 puffs into the lungs daily Yes DARRIAN Baez CNP   fluticasone (FLONASE) 50 MCG/ACT nasal spray 2 sprays by Nasal route daily Yes DARRIAN Baez CNP   cyclobenzaprine (FLEXERIL) 10 MG tablet TAKE ONE TABLET BY MOUTH THREE TIMES A DAY AS NEEDED Yes DARRIAN Baez CNP   vitamin D (ERGOCALCIFEROL) 1.25 MG (99798 UT) CAPS capsule TAKE 1 CAPSULE BY MOUTH ONE TIME A WEEK Yes DARRIAN Baez CNP   vitamin B-12 (CYANOCOBALAMIN) 100 MCG tablet Take 0.5 tablets by mouth daily Yes DARRIAN Baez CNP   gabapentin (NEURONTIN) 400 MG capsule TAKE ONE CAPSULE BY MOUTH THREE TIMES A DAY Yes DARRIAN Baez CNP   ipratropium-albuterol (DUONEB) 0.5-2.5 (3) MG/3ML SOLN nebulizer solution Inhale 3 mLs into the lungs three times daily Yes DARRIAN Baez CNP   acetaminophen (TYLENOL) 325 MG tablet Take 650 mg by mouth every 6 hours as needed for Pain Indications: Pain Yes Historical Provider, MD     Social- patient smokes    Past Medical History:   Diagnosis Date    Anxiety     Asthma     Carotid artery stenosis     Lesion    COPD (chronic obstructive pulmonary disease) ([facility] [address]     On home O2     Positive FIT (fecal immunochemical test)     Reflex sympathetic dystrophy of right upper extremity    ,   Past Surgical History: Procedure Laterality Date    CARDIAC CATHETERIZATION      HYSTERECTOMY      total    TONSILLECTOMY      VASCULAR SURGERY      VEIN SURGERY Right 1997    right small and large jugular veiw removed      [] OTHER:    Constitutional: [x] Appears well-developed and well-nourished [] No apparent distress                            [] Abnormal-   Mental status  [x] Alert and awake  [x] Oriented to person/place/time [x]Able to follow commands       Eyes:  EOM    []  Normal  [] Abnormal-  Sclera  []  Normal  [] Abnormal -         Discharge []  None visible  [] Abnormal -     HENT:   [] Normocephalic, atraumatic. [] Abnormal   [] Mouth/Throat: Mucous membranes are moist.      External Ears [] Normal  [] Abnormal-      Neck: [] No visualized mass      Pulmonary/Chest: [x] Respiratory effort normal.  [] No visualized signs of difficulty breathing or respiratory distress        [] Abnormal-      Musculoskeletal:   [] Normal gait with no signs of ataxia         [] Normal range of motion of neck        [] Abnormal-   [] Motor grossly intact in visible upper extremities    [] Motor grossly intact in visible lower extremities        Neurological:        [] No Facial Asymmetry (Cranial nerve 7 motor function) (limited exam to video visit)                       [] No gaze palsy        [] Abnormal-         Skin:                     [] No significant exanthematous lesions or discoloration noted on facial skin         [] Abnormal-                                  Psychiatric:           [x] Normal Affect [] No Hallucinations        [] Abnormal- [] Normal Mood  [] Anxious appearing    [] Depressed appearing  [] Confused appearing      Due to this being a TeleHealth encounter, evaluation of the following organ systems is limited: Vitals/Constitutional/EENT/Resp/CV/GI//MS/Neuro/Skin/Heme-Lymph-Imm. ASSESSMENT/PLAN:  Encounter Diagnoses   Name Primary?     Essential hypertension     Chronic pain syndrome     Medication refill  Allergic rhinitis, unspecified seasonality, unspecified trigger     Stenosis of carotid artery, unspecified laterality Yes       Orders Placed This Encounter   Medications    lisinopril (PRINIVIL;ZESTRIL) 10 MG tablet     Sig: Take 1 tablet by mouth daily     Dispense:  30 tablet     Refill:  5    amitriptyline (ELAVIL) 25 MG tablet     Sig: TAKE ONE TABLET BY MOUTH ONCE NIGHTLY     Dispense:  30 tablet     Refill:  5    omeprazole (PRILOSEC) 20 MG delayed release capsule     Sig: Take 1 capsule by mouth daily     Dispense:  30 capsule     Refill:  5    hydrOXYzine (VISTARIL) 25 MG capsule     Sig: TAKE 1 CAPSULE BY MOUTH THREE TIMES A DAY AS NEEDED FOR ITCHING     Dispense:  30 capsule     Refill:  5    VENTOLIN  (90 Base) MCG/ACT inhaler     Sig: INHALE 2 PUFFS INTO THE LUNGS FOUR TIMES A DAY AS NEEDED     Dispense:  1 Inhaler     Refill:  5    tiotropium (SPIRIVA RESPIMAT) 2.5 MCG/ACT AERS inhaler     Sig: Inhale 2 puffs into the lungs daily     Dispense:  1 Inhaler     Refill:  5    fluticasone (FLONASE) 50 MCG/ACT nasal spray     Si sprays by Nasal route daily     Dispense:  1 Bottle     Refill:  5         No follow-ups on file. The time that was spent with the family/patient addressing care on this video call and chart review was 25 minutes. An  electronic signature was used to authenticate this note. --DARRIAN Kevin - CNP on 2020 at 1:58 PM        Pursuant to the emergency declaration under the [facility], [facility] waiver authority and the [employer] and [employer] Act, this Virtual  Visit was conducted, with patient's consent, to reduce the patient's risk of exposure to COVID-19 and provide continuity of care for an established patient. Services were provided through a telephone discussion virtually to substitute for in-person clinic visit.

## 2020-06-30 NOTE — PROGRESS NOTES
Patient is present for 3 month f/u    Patient states she has been getting headaches  States this has been going on for years but has been getting worse  States she gets a headache at least 3x a week    [employer] is asking for a discontinuation of oxygen

## 2020-07-31 RX ORDER — GABAPENTIN 400 MG/1
CAPSULE ORAL
Qty: 90 CAPSULE | Refills: 2 | Status: SHIPPED | OUTPATIENT
Start: 2020-07-31 | End: 2020-12-08 | Stop reason: SDUPTHER

## 2020-07-31 NOTE — TELEPHONE ENCOUNTER
Last visit: 6/30/20  Last Med refill: 4/30/20  Does patient have enough medication for 72 hours: No:     Next Visit Date:  No future appointments.     Health Maintenance   Topic Date Due    Hepatitis C screen  1960    Pneumococcal 0-64 years Vaccine (1 of 1 - PPSV23) 12/26/1964    HIV screen  12/26/1973    DTaP/Tdap/Td vaccine (1 - Tdap) 12/26/1977    Cervical cancer screen  12/26/1979    Lipid screen  12/26/1998    Breast cancer screen  12/26/2008    Potassium monitoring  12/21/2017    Creatinine monitoring  12/21/2017    Colon Cancer Screen FIT/FOBT  04/25/2018    Shingles Vaccine (1 of 2) 09/25/2020 (Originally 12/26/2008)    Flu vaccine (1) 09/01/2020    Hepatitis A vaccine  Aged Out    Hepatitis B vaccine  Aged Out    Hib vaccine  Aged Out    Meningococcal (ACWY) vaccine  Aged Out       No results found for: LABA1C          ( goal A1C is < 7)   No results found for: LABMICR  No results found for: LDLCHOLESTEROL, LDLCALC    (goal LDL is <100)   BUN (mg/dL)   Date Value   12/21/2016 16     BP Readings from Last 3 Encounters:   12/18/19 104/68   09/25/19 121/81   06/14/19 (!) 144/86          (goal 120/80)    All Future Testing planned in [facility]  Lab Frequency Next Occurrence   POCT FECAL IMMUNOCHEMICAL TEST (FIT) Once 12/18/2020   Lipid Panel Once 09/23/2020   HIV Screen Once 09/23/2020   Hepatitis C Antibody Once 09/23/2020   VL DUP CAROTID BILATERAL Once 07/30/2020               Patient Active Problem List:     Carotid artery stenosis     Anxiety     SOB (shortness of breath)     Oxygen desaturation     COPD exacerbation (HCC)     Sympathetic nerve injury

## 2020-11-09 RX ORDER — GABAPENTIN 400 MG/1
CAPSULE ORAL
Qty: 90 CAPSULE | Refills: 2 | OUTPATIENT
Start: 2020-11-09 | End: 2021-02-06

## 2020-11-11 ENCOUNTER — TELEPHONE (OUTPATIENT)
Dept: FAMILY MEDICINE CLINIC | Age: 60
End: 2020-11-11

## 2020-12-04 DIAGNOSIS — Z76.0 MEDICATION REFILL: ICD-10-CM

## 2020-12-04 RX ORDER — CYCLOBENZAPRINE HCL 10 MG
TABLET ORAL
Qty: 90 TABLET | Refills: 0 | Status: SHIPPED | OUTPATIENT
Start: 2020-12-04 | End: 2020-12-29 | Stop reason: SDUPTHER

## 2020-12-04 NOTE — TELEPHONE ENCOUNTER
Last visit: 6/30/2020  Last Med refill: 5/27/2020  Does patient have enough medication for 72 hours: Yes     Next Visit Date:  Future Appointments   Date Time Provider Junior Neal   12/11/2020  2:45 PM DARRIAN Paredes CNP [address] Maintenance   Topic Date Due    Hepatitis C screen  1960    Pneumococcal 0-64 years Vaccine (1 of 1 - PPSV23) 12/26/1964    HIV screen  12/26/1973    DTaP/Tdap/Td vaccine (1 - Tdap) 12/26/1977    Cervical cancer screen  12/26/1979    Lipid screen  12/26/1998    Breast cancer screen  12/26/2008    Shingles Vaccine (1 of 2) 12/26/2008    Potassium monitoring  12/21/2017    Creatinine monitoring  12/21/2017    Colon Cancer Screen FIT/FOBT  04/25/2018    Flu vaccine (1) 09/01/2020    Hepatitis A vaccine  Aged Out    Hepatitis B vaccine  Aged Out    Hib vaccine  Aged Out    Meningococcal (ACWY) vaccine  Aged Out       No results found for: LABA1C          ( goal A1C is < 7)   No results found for: LABMICR  No results found for: LDLCHOLESTEROL, LDLCALC    (goal LDL is <100)   BUN (mg/dL)   Date Value   12/21/2016 16     BP Readings from Last 3 Encounters:   12/18/19 104/68   09/25/19 121/81   06/14/19 (!) 144/86          (goal 120/80)    All Future Testing planned in [facility]  Lab Frequency Next Occurrence   POCT FECAL IMMUNOCHEMICAL TEST (FIT) Once 12/18/2020   VL DUP CAROTID BILATERAL Once 01/18/2021               Patient Active Problem List:     Carotid artery stenosis     Anxiety     SOB (shortness of breath)     Oxygen desaturation     COPD exacerbation (HCC)     Sympathetic nerve injury

## 2020-12-04 NOTE — TELEPHONE ENCOUNTER
PT has an appt on 12/11 and would like to see if this script can be called in earlier so she does not run out. Please contact PT and let her know if this is approved.

## 2020-12-08 ENCOUNTER — TELEPHONE (OUTPATIENT)
Dept: FAMILY MEDICINE CLINIC | Age: 60
End: 2020-12-08

## 2020-12-08 RX ORDER — GABAPENTIN 400 MG/1
CAPSULE ORAL
Qty: 90 CAPSULE | Refills: 0 | Status: SHIPPED | OUTPATIENT
Start: 2020-12-08 | End: 2020-12-29 | Stop reason: SDUPTHER

## 2020-12-08 NOTE — TELEPHONE ENCOUNTER
ECC received a call from:    Name of Caller:  Cameron Haas    Relationship to patient: self    Organization name: n/a    Best contact number: [phone]    Reason for call:  Patient asking if there is anyway that her appointment  that is currently schedule for 12/11/2020 be changed to a VV appointment for the same day. Her son that lives with her has been diagnose with COVID 19. She does not want to come into office to potential expose office staff. I explained to her that the next appointment would be late December and she does not want to wait until then to get her medication.     Please call patient back to advise    Thank you

## 2020-12-08 NOTE — TELEPHONE ENCOUNTER
Spoke with patient and rescheduled her to a virtual visit on 12/29. Patient states her son has COVID and she has been feeling sick. She is asking if you can refill her gabapentin until her next appt.

## 2020-12-29 ENCOUNTER — TELEMEDICINE (OUTPATIENT)
Dept: FAMILY MEDICINE CLINIC | Age: 60
End: 2020-12-29
Payer: COMMERCIAL

## 2020-12-29 PROCEDURE — 99214 OFFICE O/P EST MOD 30 MIN: CPT | Performed by: NURSE PRACTITIONER

## 2020-12-29 PROCEDURE — 3017F COLORECTAL CA SCREEN DOC REV: CPT | Performed by: NURSE PRACTITIONER

## 2020-12-29 PROCEDURE — G8427 DOCREV CUR MEDS BY ELIG CLIN: HCPCS | Performed by: NURSE PRACTITIONER

## 2020-12-29 RX ORDER — UBIDECARENONE 75 MG
50 CAPSULE ORAL DAILY
Qty: 30 TABLET | Refills: 5 | Status: SHIPPED | OUTPATIENT
Start: 2020-12-29

## 2020-12-29 RX ORDER — OMEPRAZOLE 20 MG/1
20 CAPSULE, DELAYED RELEASE ORAL DAILY
Qty: 30 CAPSULE | Refills: 5 | Status: SHIPPED | OUTPATIENT
Start: 2020-12-29 | End: 2021-07-22 | Stop reason: SDUPTHER

## 2020-12-29 RX ORDER — FLUTICASONE PROPIONATE 50 MCG
2 SPRAY, SUSPENSION (ML) NASAL DAILY
Qty: 1 BOTTLE | Refills: 5 | Status: SHIPPED | OUTPATIENT
Start: 2020-12-29

## 2020-12-29 RX ORDER — AMITRIPTYLINE HYDROCHLORIDE 25 MG/1
TABLET, FILM COATED ORAL
Qty: 30 TABLET | Refills: 5 | Status: SHIPPED | OUTPATIENT
Start: 2020-12-29 | End: 2021-07-22 | Stop reason: SDUPTHER

## 2020-12-29 RX ORDER — CYCLOBENZAPRINE HCL 10 MG
TABLET ORAL
Qty: 90 TABLET | Refills: 0 | Status: SHIPPED | OUTPATIENT
Start: 2020-12-29 | End: 2021-02-12

## 2020-12-29 RX ORDER — HYDROXYZINE PAMOATE 25 MG/1
CAPSULE ORAL
Qty: 30 CAPSULE | Refills: 5 | Status: SHIPPED | OUTPATIENT
Start: 2020-12-29

## 2020-12-29 RX ORDER — GABAPENTIN 400 MG/1
CAPSULE ORAL
Qty: 90 CAPSULE | Refills: 0 | Status: SHIPPED | OUTPATIENT
Start: 2020-12-29 | End: 2021-02-12

## 2020-12-29 RX ORDER — ERGOCALCIFEROL 1.25 MG/1
CAPSULE ORAL
Qty: 4 CAPSULE | Refills: 5 | Status: SHIPPED | OUTPATIENT
Start: 2020-12-29

## 2020-12-29 RX ORDER — LISINOPRIL 10 MG/1
10 TABLET ORAL DAILY
Qty: 30 TABLET | Refills: 5 | Status: SHIPPED | OUTPATIENT
Start: 2020-12-29

## 2020-12-29 SDOH — ECONOMIC STABILITY: INCOME INSECURITY: HOW HARD IS IT FOR YOU TO PAY FOR THE VERY BASICS LIKE FOOD, HOUSING, MEDICAL CARE, AND HEATING?: NOT ASKED

## 2020-12-29 SDOH — ECONOMIC STABILITY: FOOD INSECURITY: WITHIN THE PAST 12 MONTHS, YOU WORRIED THAT YOUR FOOD WOULD RUN OUT BEFORE YOU GOT MONEY TO BUY MORE.: NOT ASKED

## 2020-12-29 SDOH — ECONOMIC STABILITY: FOOD INSECURITY: WITHIN THE PAST 12 MONTHS, THE FOOD YOU BOUGHT JUST DIDN'T LAST AND YOU DIDN'T HAVE MONEY TO GET MORE.: NOT ASKED

## 2020-12-29 SDOH — ECONOMIC STABILITY: TRANSPORTATION INSECURITY
IN THE PAST 12 MONTHS, HAS THE LACK OF TRANSPORTATION KEPT YOU FROM MEDICAL APPOINTMENTS OR FROM GETTING MEDICATIONS?: YES

## 2020-12-29 SDOH — ECONOMIC STABILITY: TRANSPORTATION INSECURITY
IN THE PAST 12 MONTHS, HAS LACK OF TRANSPORTATION KEPT YOU FROM MEETINGS, WORK, OR FROM GETTING THINGS NEEDED FOR DAILY LIVING?: YES

## 2020-12-29 ASSESSMENT — ENCOUNTER SYMPTOMS
COUGH: 0
SHORTNESS OF BREATH: 0

## 2020-12-29 NOTE — PROGRESS NOTES
VISIT LOCATION: Home    TELEHEALTH EVALUATION -- Audio/Visual (During IBACD-42 public health emergency)    Due to COVID 23 outbreak, patient's office visit was converted to a virtual visit. Patient was contacted and agreed to proceed with a virtual visit via [facility] Chuyita Srivastava Visit  The risks and benefits of converting to a virtual visit were discussed in light of the current infectious disease epidemic. Patient also understood that insurance coverage and co-pays are up to their individual insurance plans. Adela Howard (:  1960) has requested an audio/video evaluation for the following concern(s):    Chief Complaint:   HPI:  She is recovering from covid- starting to feel better    COPD:  Current treatment includes short-acting beta agonist inhaler, anticholinergic inhaler. Residual symptoms: acute dyspnea and improving. She denies any other symptoms. She requires her rescue inhaler multiple time(s) per week. She is using her albuterol more frequently with covid. Hypertension:  Home blood pressure monitoring: No.  She is adherent to a low sodium diet. Patient denies chest pain, headache, lightheadedness, blurred vision and peripheral edema. Antihypertensive medication side effects: no medication side effects noted. Use of agents associated with hypertension: none. She had bloody bowel movements when her covid started, she was going to go to hospital but didn't. She was referred to gi in 2018 for positive fit test, she did not get this done. Still hasn't gotten lab work done. They have been ordered multiple times. states her son will take her tomorrow. Pt is here for refill of gabapentin.   She takes this for sympathetic nerve injury.  She has not gone to see pain management.  she is stable on gabapentin.                                         Sodium (mmol/L)   Date Value   2016 138    BUN (mg/dL)   Date Value   2016 16    Glucose (mg/dL)   Date Value   2016 132 (H)      Potassium (mmol/L)   Date Value   12/21/2016 5.1    CREATININE (mg/dL)   Date Value   12/21/2016 0.53         Review of Systems   Constitutional: Negative for chills and fever. Respiratory: Negative for cough and shortness of breath. Cardiovascular: Negative for chest pain, palpitations and leg swelling. Prior to Visit Medications    Medication Sig Taking?  Authorizing Provider   lisinopril (PRINIVIL;ZESTRIL) 10 MG tablet Take 1 tablet by mouth daily Yes DARRIAN Gant CNP   amitriptyline (ELAVIL) 25 MG tablet TAKE ONE TABLET BY MOUTH ONCE NIGHTLY Yes DARRIAN Gant CNP   omeprazole (PRILOSEC) 20 MG delayed release capsule Take 1 capsule by mouth daily Yes DARRIAN Gant CNP   hydrOXYzine (VISTARIL) 25 MG capsule TAKE 1 CAPSULE BY MOUTH THREE TIMES A DAY AS NEEDED FOR ITCHING Yes DARRIAN Gant CNP   VENTOLIN  (90 Base) MCG/ACT inhaler INHALE 2 PUFFS INTO THE LUNGS FOUR TIMES A DAY AS NEEDED Yes DARRIAN Gant CNP   tiotropium (SPIRIVA RESPIMAT) 2.5 MCG/ACT AERS inhaler Inhale 2 puffs into the lungs daily Yes DARRIAN Gant CNP   fluticasone (FLONASE) 50 MCG/ACT nasal spray 2 sprays by Nasal route daily Yes DARRIAN Gant CNP   vitamin D (ERGOCALCIFEROL) 1.25 MG (48664 UT) CAPS capsule TAKE 1 CAPSULE BY MOUTH ONE TIME A WEEK Yes DARRIAN Gant CNP   vitamin B-12 (CYANOCOBALAMIN) 100 MCG tablet Take 0.5 tablets by mouth daily Yes DARRIAN Gant CNP   gabapentin (NEURONTIN) 400 MG capsule TAKE ONE CAPSULE BY MOUTH THREE TIMES A DAY Yes DARRIAN Gant CNP   cyclobenzaprine (FLEXERIL) 10 MG tablet TAKE ONE TABLET BY MOUTH THREE TIMES A DAY AS NEEDED Yes DARRIAN Gant CNP   ipratropium-albuterol (DUONEB) 0.5-2.5 (3) MG/3ML SOLN nebulizer solution Inhale 3 mLs into the lungs three times daily Yes DARRIAN Gant CNP   acetaminophen (ERGOCALCIFEROL) 1.25 MG (17231 UT) CAPS capsule     Sig: TAKE 1 CAPSULE BY MOUTH ONE TIME A WEEK     Dispense:  4 capsule     Refill:  5    vitamin B-12 (CYANOCOBALAMIN) 100 MCG tablet     Sig: Take 0.5 tablets by mouth daily     Dispense:  30 tablet     Refill:  5    gabapentin (NEURONTIN) 400 MG capsule     Sig: TAKE ONE CAPSULE BY MOUTH THREE TIMES A DAY     Dispense:  90 capsule     Refill:  0    cyclobenzaprine (FLEXERIL) 10 MG tablet     Sig: TAKE ONE TABLET BY MOUTH THREE TIMES A DAY AS NEEDED     Dispense:  90 tablet     Refill:  0         No follow-ups on file. The time that was spent with the family/patient addressing care on this video call and chart review was 25 minutes. An  electronic signature was used to authenticate this note. --DARRIAN Billingsley - CNP on 12/29/2020 at 1:20 PM        Pursuant to the emergency declaration under the [facility], [facility] waiver authority and the [employer] and [employer] Act, this Virtual  Visit was conducted, with patient's consent, to reduce the patient's risk of exposure to COVID-19 and provide continuity of care for an established patient. Services were provided through a telephone discussion virtually to substitute for in-person clinic visit.

## 2021-01-06 DIAGNOSIS — Z76.0 MEDICATION REFILL: ICD-10-CM

## 2021-01-06 NOTE — TELEPHONE ENCOUNTER
Barbie from [address] [place] [address] called to ask that the MATTY be removed from the Ventolin script (done) and to send new script to local pharmacy    Last visit: 12/29/2020  Last Med refill:   Does patient have enough medication for 72 hours: No:     Next Visit Date:  No future appointments.     Health Maintenance   Topic Date Due    Hepatitis C screen  1960    Pneumococcal 0-64 years Vaccine (1 of 1 - PPSV23) 12/26/1964    HIV screen  12/26/1973    DTaP/Tdap/Td vaccine (1 - Tdap) 12/26/1977    Cervical cancer screen  12/26/1979    Lipid screen  12/26/1998    Breast cancer screen  12/26/2008    Potassium monitoring  12/21/2017    Creatinine monitoring  12/21/2017    Colon Cancer Screen FIT/FOBT  04/25/2018    Flu vaccine (1) 09/01/2020    Shingles Vaccine (1 of 2) 12/29/2021 (Originally 12/26/2008)    Hepatitis A vaccine  Aged Out    Hepatitis B vaccine  Aged Out    Hib vaccine  Aged Out    Meningococcal (ACWY) vaccine  Aged Out       No results found for: LABA1C          ( goal A1C is < 7)   No results found for: LABMICR  No results found for: LDLCHOLESTEROL, LDLCALC    (goal LDL is <100)   BUN (mg/dL)   Date Value   12/21/2016 16     BP Readings from Last 3 Encounters:   12/18/19 104/68   09/25/19 121/81   06/14/19 (!) 144/86          (goal 120/80)    All Future Testing planned in [facility]  Lab Frequency Next Occurrence   VL DUP CAROTID BILATERAL Once 01/18/2021   CBC Once 06/29/2021   Comprehensive Metabolic Panel Once 62/30/2677   Lipid, Fasting Once 06/29/2021   JAMILA DIGITAL SCREEN W OR WO CAD BILATERAL Once 06/29/2021   Hepatitis C Antibody Once 12/29/2020   HIV Screen Once 06/29/2021   TSH Once 12/29/2020               Patient Active Problem List:     Carotid artery stenosis     Anxiety     SOB (shortness of breath)     Oxygen desaturation     COPD exacerbation (HCC)     Sympathetic nerve injury

## 2021-01-08 RX ORDER — ALBUTEROL SULFATE 90 UG/1
AEROSOL, METERED RESPIRATORY (INHALATION)
Qty: 1 INHALER | Refills: 5 | Status: SHIPPED | OUTPATIENT
Start: 2021-01-08

## 2021-02-10 DIAGNOSIS — Z76.0 MEDICATION REFILL: ICD-10-CM

## 2021-02-10 DIAGNOSIS — G89.4 CHRONIC PAIN SYNDROME: ICD-10-CM

## 2021-02-10 NOTE — TELEPHONE ENCOUNTER
Last visit: 12/29/2020  Last Med refill: 12/29/2020  Does patient have enough medication for 72 hours: No:     Next Visit Date:  No future appointments.     Health Maintenance   Topic Date Due    Hepatitis C screen  1960    Pneumococcal 0-64 years Vaccine (1 of 1 - PPSV23) 12/26/1964    HIV screen  12/26/1973    DTaP/Tdap/Td vaccine (1 - Tdap) 12/26/1977    Cervical cancer screen  12/26/1979    Lipid screen  12/26/1998    Breast cancer screen  12/26/2008    Potassium monitoring  12/21/2017    Creatinine monitoring  12/21/2017    Colon Cancer Screen FIT/FOBT  04/25/2018    Flu vaccine (1) 09/01/2020    Shingles Vaccine (1 of 2) 12/29/2021 (Originally 12/26/2008)    Hepatitis A vaccine  Aged Out    Hepatitis B vaccine  Aged Out    Hib vaccine  Aged Out    Meningococcal (ACWY) vaccine  Aged Out       No results found for: LABA1C          ( goal A1C is < 7)   No results found for: LABMICR  No results found for: LDLCHOLESTEROL, LDLCALC    (goal LDL is <100)   BUN (mg/dL)   Date Value   12/21/2016 16     BP Readings from Last 3 Encounters:   12/18/19 104/68   09/25/19 121/81   06/14/19 (!) 144/86          (goal 120/80)    All Future Testing planned in [facility]  Lab Frequency Next Occurrence   VL DUP CAROTID BILATERAL Once 03/02/2021   CBC Once 06/29/2021   Comprehensive Metabolic Panel Once 86/81/2545   Lipid, Fasting Once 06/29/2021   JAMILA DIGITAL SCREEN W OR WO CAD BILATERAL Once 06/29/2021   Hepatitis C Antibody Once 02/07/2021   HIV Screen Once 06/29/2021   TSH Once 02/07/2021               Patient Active Problem List:     Carotid artery stenosis     Anxiety     SOB (shortness of breath)     Oxygen desaturation     COPD exacerbation (HCC)     Sympathetic nerve injury

## 2021-02-11 ENCOUNTER — TELEPHONE (OUTPATIENT)
Dept: GASTROENTEROLOGY | Age: 61
End: 2021-02-11

## 2021-02-12 RX ORDER — CYCLOBENZAPRINE HCL 10 MG
TABLET ORAL
Qty: 90 TABLET | Refills: 0 | Status: SHIPPED | OUTPATIENT
Start: 2021-02-12 | End: 2021-03-22

## 2021-02-12 RX ORDER — GABAPENTIN 400 MG/1
CAPSULE ORAL
Qty: 90 CAPSULE | Refills: 0 | Status: SHIPPED | OUTPATIENT
Start: 2021-02-12 | End: 2021-04-01 | Stop reason: SDUPTHER

## 2021-03-18 DIAGNOSIS — G89.4 CHRONIC PAIN SYNDROME: ICD-10-CM

## 2021-03-18 DIAGNOSIS — Z76.0 MEDICATION REFILL: ICD-10-CM

## 2021-03-19 RX ORDER — GABAPENTIN 400 MG/1
CAPSULE ORAL
Qty: 90 CAPSULE | Refills: 0 | OUTPATIENT
Start: 2021-03-19

## 2021-03-19 NOTE — TELEPHONE ENCOUNTER
Last visit: 12/29/20  Last Med refill: 2/12/21  Does patient have enough medication for 72 hours: No:     Next Visit Date:  No future appointments.     Health Maintenance   Topic Date Due    Hepatitis C screen  Never done    Pneumococcal 0-64 years Vaccine (1 of 1 - PPSV23) Never done    HIV screen  Never done    COVID-19 Vaccine (1) Never done    DTaP/Tdap/Td vaccine (1 - Tdap) Never done    Cervical cancer screen  Never done    Lipid screen  Never done    Breast cancer screen  Never done    Potassium monitoring  12/21/2017    Creatinine monitoring  12/21/2017    Colon Cancer Screen FIT/FOBT  04/25/2018    Flu vaccine (1) Never done    Shingles Vaccine (1 of 2) 12/29/2021 (Originally 12/26/2008)    Hepatitis A vaccine  Aged Out    Hepatitis B vaccine  Aged Out    Hib vaccine  Aged Out    Meningococcal (ACWY) vaccine  Aged Out       No results found for: LABA1C          ( goal A1C is < 7)   No results found for: LABMICR  No results found for: LDLCHOLESTEROL, LDLCALC    (goal LDL is <100)   BUN (mg/dL)   Date Value   12/21/2016 16     BP Readings from Last 3 Encounters:   12/18/19 104/68   09/25/19 121/81   06/14/19 (!) 144/86          (goal 120/80)    All Future Testing planned in [facility]  Lab Frequency Next Occurrence   VL DUP CAROTID BILATERAL Once 04/17/2021   CBC Once 06/29/2021   Comprehensive Metabolic Panel Once 33/24/8332   Lipid, Fasting Once 06/29/2021   JAMILA DIGITAL SCREEN W OR WO CAD BILATERAL Once 06/29/2021   Hepatitis C Antibody Once 04/18/2021   HIV Screen Once 06/29/2021   TSH Once 04/18/2021               Patient Active Problem List:     Carotid artery stenosis     Anxiety     SOB (shortness of breath)     Oxygen desaturation     COPD exacerbation (HCC)     Sympathetic nerve injury

## 2021-03-22 RX ORDER — CYCLOBENZAPRINE HCL 10 MG
TABLET ORAL
Qty: 90 TABLET | Refills: 0 | Status: SHIPPED | OUTPATIENT
Start: 2021-03-22 | End: 2021-04-01 | Stop reason: SDUPTHER

## 2021-04-01 ENCOUNTER — TELEMEDICINE (OUTPATIENT)
Dept: FAMILY MEDICINE CLINIC | Age: 61
End: 2021-04-01
Payer: COMMERCIAL

## 2021-04-01 DIAGNOSIS — R05.8 POST-VIRAL COUGH SYNDROME: ICD-10-CM

## 2021-04-01 DIAGNOSIS — G89.4 CHRONIC PAIN SYNDROME: ICD-10-CM

## 2021-04-01 DIAGNOSIS — J44.9 CHRONIC OBSTRUCTIVE PULMONARY DISEASE, UNSPECIFIED COPD TYPE (HCC): ICD-10-CM

## 2021-04-01 DIAGNOSIS — Z76.0 MEDICATION REFILL: ICD-10-CM

## 2021-04-01 DIAGNOSIS — F43.21 SITUATIONAL DEPRESSION: Primary | ICD-10-CM

## 2021-04-01 PROCEDURE — 3017F COLORECTAL CA SCREEN DOC REV: CPT | Performed by: NURSE PRACTITIONER

## 2021-04-01 PROCEDURE — 99214 OFFICE O/P EST MOD 30 MIN: CPT | Performed by: NURSE PRACTITIONER

## 2021-04-01 PROCEDURE — G8427 DOCREV CUR MEDS BY ELIG CLIN: HCPCS | Performed by: NURSE PRACTITIONER

## 2021-04-01 PROCEDURE — 3023F SPIROM DOC REV: CPT | Performed by: NURSE PRACTITIONER

## 2021-04-01 PROCEDURE — 4004F PT TOBACCO SCREEN RCVD TLK: CPT | Performed by: NURSE PRACTITIONER

## 2021-04-01 PROCEDURE — G8926 SPIRO NO PERF OR DOC: HCPCS | Performed by: NURSE PRACTITIONER

## 2021-04-01 PROCEDURE — G8421 BMI NOT CALCULATED: HCPCS | Performed by: NURSE PRACTITIONER

## 2021-04-01 RX ORDER — GABAPENTIN 400 MG/1
CAPSULE ORAL
Qty: 90 CAPSULE | Refills: 2 | Status: SHIPPED | OUTPATIENT
Start: 2021-04-01 | End: 2021-05-19

## 2021-04-01 RX ORDER — PAROXETINE 10 MG/1
10 TABLET, FILM COATED ORAL DAILY
Qty: 30 TABLET | Refills: 2 | Status: SHIPPED | OUTPATIENT
Start: 2021-04-01 | End: 2021-07-12

## 2021-04-01 RX ORDER — CYCLOBENZAPRINE HCL 10 MG
TABLET ORAL
Qty: 90 TABLET | Refills: 2 | Status: SHIPPED | OUTPATIENT
Start: 2021-04-01

## 2021-04-01 ASSESSMENT — PATIENT HEALTH QUESTIONNAIRE - PHQ9
1. LITTLE INTEREST OR PLEASURE IN DOING THINGS: 0
SUM OF ALL RESPONSES TO PHQ QUESTIONS 1-9: 0
2. FEELING DOWN, DEPRESSED OR HOPELESS: 0

## 2021-04-01 ASSESSMENT — ENCOUNTER SYMPTOMS
COUGH: 0
SHORTNESS OF BREATH: 0

## 2021-04-01 NOTE — LETTER
[address] [facility] [address]  Phone: [phone]  Fax: [phone]    Suresh MartinezDARRIAN CNP      April 1, 2021        [address]  [facility] [address]        Dear Kaz Finnegan      Thank you for scheduling your mammogram. You are currently schedule at [facility] for 5/6/21 at 2:15am.     As a reminder to you, we ask that you please bring the attached order to [facility]. Please arrive 10 minutes prior to your appointment. Please do not use underarm deodorant or use talcum powder. Sincerely,    [id number] [id number]     [address] [facility].   [address], [facility]

## 2021-04-01 NOTE — PROGRESS NOTES
VISIT LOCATION: Home    TELEHEALTH EVALUATION -- Audio/Visual (During YQKSN-82 public health emergency)    Due to COVID 23 outbreak, patient's office visit was converted to a virtual visit. Patient was contacted and agreed to proceed with a virtual visit via [address] Visit  The risks and benefits of converting to a virtual visit were discussed in light of the current infectious disease epidemic. Patient also understood that insurance coverage and co-pays are up to their individual insurance plans. Adelita Gama (:  1960) has requested an audio/video evaluation for the following concern(s):    Chief Complaint:   HPI:  Her headaches are the same as we discussed in . I ordered a carotid scan, she has not gotten this done. She states she will call and make an apt. Pt reports she has an allergy to aluminum- she ate carrots out of the can the other day and now she has a white film on her tongue. She states she is going to get her labs done next Thursday when she gets her mammogram done. Pt is here for refill of gabapentin.   She takes this for sympathetic nerve injury.  She has not gone to see pain management.  she is stable on gabapentin.     Copd- using spiriva and ventolin. Still smoking.  Using albuterol once a day out of habit. She would like to go back on paxil for her mood. She is feeling depressed and down. She feels it is from her chronic pain. She has not been on paxil since . Review of Systems   Constitutional: Negative for chills and fever. Respiratory: Negative for cough and shortness of breath. Cardiovascular: Negative for chest pain, palpitations and leg swelling. Musculoskeletal: Positive for arthralgias. Prior to Visit Medications    Medication Sig Taking?  Authorizing Provider   gabapentin (NEURONTIN) 400 MG capsule TAKE ONE CAPSULE BY MOUTH THREE TIMES A DAY Yes Ryley Darden APRN - CNP   cyclobenzaprine (FLEXERIL) 10 MG tablet TAKE ONE TABLET BY MOUTH THREE TIMES A DAY AS NEEDED Yes [facility], APRCAYR - CNP   nystatin (MYCOSTATIN) 356106 UNIT/ML suspension Take 5 mLs by mouth 4 times daily for 10 days Retain in mouth as long as possible [facility]  [facility]   guaiFENesin (MUCINEX) 600 MG extended release tablet Take 1 tablet by mouth 2 times daily Yes [facility], DARRIAN - CNP   albuterol sulfate HFA (VENTOLIN HFA) 108 (90 Base) MCG/ACT inhaler INHALE 2 PUFFS INTO THE LUNGS FOUR TIMES A DAY AS NEEDED Yes [facility], DARRIAN - CNP   lisinopril (PRINIVIL;ZESTRIL) 10 MG tablet Take 1 tablet by mouth daily Yes [facility], DARRIAN  CNP   amitriptyline (ELAVIL) 25 MG tablet TAKE ONE TABLET BY MOUTH ONCE NIGHTLY Yes [facility], DARRIAN - CNP   omeprazole (PRILOSEC) 20 MG delayed release capsule Take 1 capsule by mouth daily Yes [facility], DARRIAN - CNP   hydrOXYzine (VISTARIL) 25 MG capsule TAKE 1 CAPSULE BY MOUTH THREE TIMES A DAY AS NEEDED FOR ITCHING [facility]   tiotropium (SPIRIVA RESPIMAT) 2.5 MCG/ACT AERS inhaler Inhale 2 puffs into the lungs daily Yes [facility], DARRIAN  CNP   fluticasone (FLONASE) 50 MCG/ACT nasal spray 2 sprays by Nasal route daily Yes [facility], DARRIAN - CNP   vitamin D (ERGOCALCIFEROL) 1.25 MG (95921 UT) CAPS capsule TAKE 1 CAPSULE BY MOUTH ONE TIME A WEEK Yes [facility]   vitamin B-12 (CYANOCOBALAMIN) 100 MCG tablet Take 0.5 tablets by mouth daily Yes [facility], DARRIAN - CNP   ipratropium-albuterol (DUONEB) 0.5-2.5 (3) MG/3ML SOLN nebulizer solution Inhale 3 mLs into the lungs three times daily Yes [facility], DARRIAN - CNP   acetaminophen (TYLENOL) 325 MG tablet Take 650 mg by mouth every 6 hours as needed for Pain Indications: Pain Yes Historical Provider, MD     Social- patient smokes    Past Medical History:   Diagnosis Date    Anxiety     Asthma     Carotid artery stenosis     Lesion    COPD (chronic obstructive pulmonary disease) (HCC)     On home O2     Positive FIT (fecal immunochemical test)     Reflex sympathetic dystrophy of right upper extremity    ,   Past Surgical History:   Procedure Laterality Date    CARDIAC CATHETERIZATION      HYSTERECTOMY      total    TONSILLECTOMY      VASCULAR SURGERY      VEIN SURGERY Right 1997    right small and large jugular veiw removed              [] OTHER:    Constitutional: [x] Appears well-developed and well-nourished [] No apparent distress                            [] Abnormal-   Mental status  [x] Alert and awake  [x] Oriented to person/place/time [x]Able to follow commands       Eyes:  EOM    [x]  Normal  [] Abnormal-  Sclera  [x]  Normal  [] Abnormal -         Discharge [x]  None visible  [] Abnormal -     HENT:   [x] Normocephalic, atraumatic.   [] Abnormal   [] Mouth/Throat: Mucous membranes are moist.      External Ears [x] Normal  [] Abnormal-      Neck: [x] No visualized mass      Pulmonary/Chest: [x] Respiratory effort normal.  [] No visualized signs of difficulty breathing or respiratory distress        [] Abnormal-      Musculoskeletal:   [] Normal gait with no signs of ataxia         [x] Normal range of motion of neck        [] Abnormal-   [] Motor grossly intact in visible upper extremities    [] Motor grossly intact in visible lower extremities        Neurological:        [x] No Facial Asymmetry (Cranial nerve 7 motor function) (limited exam to video visit)                       [x] No gaze palsy        [] Abnormal-         Skin:                     [x] No significant exanthematous lesions or discoloration noted on facial skin         [] Abnormal-                                  Psychiatric:           [x] Normal Affect [] No Hallucinations        [] Abnormal- [] Normal Mood  [] Anxious appearing    [] Depressed appearing  [] Confused appearing      Due to this being a TeleHealth encounter, evaluation of the following organ systems is limited: Vitals/Constitutional/EENT/Resp/CV/GI//MS/Neuro/Skin/Heme-Lymph-Imm. ASSESSMENT/PLAN:  Encounter Diagnoses   Name Primary?  Chronic pain syndrome     Medication refill     Chronic obstructive pulmonary disease, unspecified COPD type (HCC)     Post-viral cough syndrome        Orders Placed This Encounter   Medications    gabapentin (NEURONTIN) 400 MG capsule     Sig: TAKE ONE CAPSULE BY MOUTH THREE TIMES A DAY     Dispense:  90 capsule     Refill:  2    cyclobenzaprine (FLEXERIL) 10 MG tablet     Sig: TAKE ONE TABLET BY MOUTH THREE TIMES A DAY AS NEEDED     Dispense:  90 tablet     Refill:  2    nystatin (MYCOSTATIN) 620401 UNIT/ML suspension     Sig: Take 5 mLs by mouth 4 times daily for 10 days Retain in mouth as long as possible     Dispense:  200 mL     Refill:  0    guaiFENesin (MUCINEX) 600 MG extended release tablet     Sig: Take 1 tablet by mouth 2 times daily     Dispense:  30 tablet     Refill:  0         No follow-ups on file. An  electronic signature was used to authenticate this note. --DARRIAN Mcneil CNP on 4/1/2021 at 1:27 PM        Pursuant to the emergency declaration under the [facility] waiver authority and the [employer] Act, this Virtual  Visit was conducted, with patient's consent, to reduce the patient's risk of exposure to COVID-19 and provide continuity of care for an established patient. Services were provided through a telephone discussion virtually to substitute for in-person clinic visit.

## 2021-07-12 RX ORDER — PAROXETINE 10 MG/1
TABLET, FILM COATED ORAL
Qty: 30 TABLET | Refills: 1 | Status: SHIPPED | OUTPATIENT
Start: 2021-07-12 | End: 2021-07-22 | Stop reason: SDUPTHER

## 2021-07-12 NOTE — TELEPHONE ENCOUNTER
Last visit: 04/01/2021  Last Med refill: 06/02/2021  Does patient have enough medication for 72 hours: No:     Next Visit Date:  Future Appointments   Date Time Provider Junior Neal   7/15/2021  1:45 PM Adonay Baker MD [facility] [address] Maintenance   Topic Date Due    Hepatitis C screen  Never done    Pneumococcal 0-64 years Vaccine (1 of 2 - PPSV23) Never done    COVID-19 Vaccine (1) Never done    HIV screen  Never done    DTaP/Tdap/Td vaccine (1 - Tdap) Never done    Cervical cancer screen  Never done    Lipid screen  Never done    Breast cancer screen  Never done    Potassium monitoring  12/21/2017    Creatinine monitoring  12/21/2017    Colon Cancer Screen FIT/FOBT  04/25/2018    Shingles Vaccine (1 of 2) 12/29/2021 (Originally 12/26/2008)    Flu vaccine (1) 09/01/2021    Hepatitis A vaccine  Aged Out    Hepatitis B vaccine  Aged Out    Hib vaccine  Aged Out    Meningococcal (ACWY) vaccine  Aged Out       No results found for: LABA1C          ( goal A1C is < 7)   No results found for: LABMICR  No results found for: LDLCHOLESTEROL, LDLCALC    (goal LDL is <100)   BUN (mg/dL)   Date Value   12/21/2016 16     BP Readings from Last 3 Encounters:   12/18/19 104/68   09/25/19 121/81   06/14/19 (!) 144/86          (goal 120/80)    All Future Testing planned in [facility]  Lab Frequency Next Occurrence   JAMILA DIGITAL SCREEN W OR WO CAD BILATERAL Once 06/29/2021               Patient Active Problem List:     Carotid artery stenosis     Anxiety     SOB (shortness of breath)     Oxygen desaturation     COPD exacerbation (HCC)     Sympathetic nerve injury

## 2021-07-16 ENCOUNTER — TELEPHONE (OUTPATIENT)
Dept: GASTROENTEROLOGY | Age: 61
End: 2021-07-16

## 2021-07-20 ENCOUNTER — NURSE TRIAGE (OUTPATIENT)
Dept: OTHER | Facility: CLINIC | Age: 61
End: 2021-07-20

## 2021-07-20 NOTE — TELEPHONE ENCOUNTER
Reason for Disposition   Patient wants to be seen    Answer Assessment - Initial Assessment Questions  1. DESCRIPTION: \"Describe your dizziness. \"      Dizziness is very random    2. LIGHTHEADED: \"Do you feel lightheaded? \" (e.g., somewhat faint, woozy, weak upon standing)      Woozy and weak, she needs to hold onto the wall    3. VERTIGO: \"Do you feel like either you or the room is spinning or tilting? \" (i.e. vertigo)      No    4. SEVERITY: \"How bad is it? \"  \"Do you feel like you are going to faint? \" \"Can you stand and walk? \"    - MILD - walking normally    - MODERATE - interferes with normal activities (e.g., work, school)     - SEVERE - unable to stand, requires support to walk, feels like passing out now. Moderate and Severe at times, with a loss of hearing    5. ONSET:  \"When did the dizziness begin? \"      Dizziness began last month    6. AGGRAVATING FACTORS: \"Does anything make it worse? \" (e.g., standing, change in head position)      Random     7. HEART RATE: \"Can you tell me your heart rate? \" \"How many beats in 15 seconds? \"  (Note: not all patients can do this)        None    8. CAUSE: \"What do you think is causing the dizziness? \"      Unknown    9. RECURRENT SYMPTOM: \"Have you had dizziness before? \" If so, ask: \"When was the last time? \" \"What happened that time? \"      Yes    10. OTHER SYMPTOMS: \"Do you have any other symptoms? \" (e.g., fever, chest pain, vomiting, diarrhea, bleeding)        Loss of hearing, headaches at times    11. PREGNANCY: \"Is there any chance you are pregnant? \" \"When was your last menstrual period? \"        No    Protocols used: DIZZINESS-ADULT-[place]  Received call from Pierre Vasquez  at [facility]  with [employer]. Brief description of triage: dizziness with some loss of hearing at times. The dizziness is intermittent but feels it may be related to the second COVID vaccine.  1st shot  6/7 and 2nd shot on 7/8/21    Triage indicates for patient to be seen in the office today    Care advice provided, patient verbalizes understanding; denies any other questions or concerns; instructed to call back for any new or worsening symptoms. Writer provided warm transfer to BECKY  at [facility]  for appointment scheduling. Attention Provider: Thank you for allowing me to participate in the care of your patient. The patient was connected to triage in response to information provided to the ECC. Please do not respond through this encounter as the response is not directed to a shared pool.

## 2021-07-21 ENCOUNTER — APPOINTMENT (OUTPATIENT)
Dept: CT IMAGING | Age: 61
End: 2021-07-21
Payer: COMMERCIAL

## 2021-07-21 ENCOUNTER — HOSPITAL ENCOUNTER (EMERGENCY)
Age: 61
Discharge: LEFT BEFORE DISCHARGE | End: 2021-07-21
Attending: EMERGENCY MEDICINE
Payer: COMMERCIAL

## 2021-07-21 ENCOUNTER — APPOINTMENT (OUTPATIENT)
Dept: GENERAL RADIOLOGY | Age: 61
End: 2021-07-21
Payer: COMMERCIAL

## 2021-07-21 ENCOUNTER — NURSE TRIAGE (OUTPATIENT)
Dept: OTHER | Facility: CLINIC | Age: 61
End: 2021-07-21

## 2021-07-21 VITALS
DIASTOLIC BLOOD PRESSURE: 75 MMHG | HEART RATE: 84 BPM | TEMPERATURE: 98.4 F | SYSTOLIC BLOOD PRESSURE: 119 MMHG | OXYGEN SATURATION: 99 % | HEIGHT: 64 IN | WEIGHT: 143 LBS | RESPIRATION RATE: 19 BRPM | BODY MASS INDEX: 24.41 KG/M2

## 2021-07-21 DIAGNOSIS — R07.9 CHEST PAIN, UNSPECIFIED TYPE: ICD-10-CM

## 2021-07-21 DIAGNOSIS — R42 DIZZINESS: Primary | ICD-10-CM

## 2021-07-21 LAB
-: NORMAL
ABSOLUTE EOS #: 0.17 K/UL (ref 0–0.44)
ABSOLUTE IMMATURE GRANULOCYTE: <0.03 K/UL (ref 0–0.3)
ABSOLUTE LYMPH #: 2.74 K/UL (ref 1.1–3.7)
ABSOLUTE MONO #: 0.6 K/UL (ref 0.1–1.2)
AMORPHOUS: NORMAL
ANION GAP SERPL CALCULATED.3IONS-SCNC: 13 MMOL/L (ref 9–17)
BACTERIA: NORMAL
BASOPHILS # BLD: 1 % (ref 0–2)
BASOPHILS ABSOLUTE: 0.06 K/UL (ref 0–0.2)
BILIRUBIN URINE: NEGATIVE
BNP INTERPRETATION: NORMAL
BUN BLDV-MCNC: 5 MG/DL (ref 8–23)
BUN/CREAT BLD: ABNORMAL (ref 9–20)
CALCIUM SERPL-MCNC: 9.7 MG/DL (ref 8.6–10.4)
CASTS UA: NORMAL /LPF (ref 0–8)
CHLORIDE BLD-SCNC: 101 MMOL/L (ref 98–107)
CO2: 26 MMOL/L (ref 20–31)
COLOR: YELLOW
CREAT SERPL-MCNC: 0.63 MG/DL (ref 0.5–0.9)
CRYSTALS, UA: NORMAL /HPF
DIFFERENTIAL TYPE: NORMAL
EOSINOPHILS RELATIVE PERCENT: 2 % (ref 1–4)
EPITHELIAL CELLS UA: NORMAL /HPF (ref 0–5)
GFR AFRICAN AMERICAN: >60 ML/MIN
GFR NON-AFRICAN AMERICAN: >60 ML/MIN
GFR SERPL CREATININE-BSD FRML MDRD: ABNORMAL ML/MIN/{1.73_M2}
GFR SERPL CREATININE-BSD FRML MDRD: ABNORMAL ML/MIN/{1.73_M2}
GLUCOSE BLD-MCNC: 90 MG/DL (ref 70–99)
GLUCOSE URINE: NEGATIVE
HCT VFR BLD CALC: 45.4 % (ref 36.3–47.1)
HEMOGLOBIN: 14.1 G/DL (ref 11.9–15.1)
IMMATURE GRANULOCYTES: 0 %
KETONES, URINE: NEGATIVE
LEUKOCYTE ESTERASE, URINE: NEGATIVE
LYMPHOCYTES # BLD: 38 % (ref 24–43)
MCH RBC QN AUTO: 29.1 PG (ref 25.2–33.5)
MCHC RBC AUTO-ENTMCNC: 31.1 G/DL (ref 28.4–34.8)
MCV RBC AUTO: 93.8 FL (ref 82.6–102.9)
MONOCYTES # BLD: 8 % (ref 3–12)
MUCUS: NORMAL
NITRITE, URINE: NEGATIVE
NRBC AUTOMATED: 0 PER 100 WBC
OTHER OBSERVATIONS UA: NORMAL
PDW BLD-RTO: 12.5 % (ref 11.8–14.4)
PH UA: 6 (ref 5–8)
PLATELET # BLD: 321 K/UL (ref 138–453)
PLATELET ESTIMATE: NORMAL
PMV BLD AUTO: 10 FL (ref 8.1–13.5)
POTASSIUM SERPL-SCNC: 4.2 MMOL/L (ref 3.7–5.3)
PRO-BNP: 25 PG/ML
PROTEIN UA: NEGATIVE
RBC # BLD: 4.84 M/UL (ref 3.95–5.11)
RBC # BLD: NORMAL 10*6/UL
RBC UA: NORMAL /HPF (ref 0–4)
RENAL EPITHELIAL, UA: NORMAL /HPF
SEG NEUTROPHILS: 51 % (ref 36–65)
SEGMENTED NEUTROPHILS ABSOLUTE COUNT: 3.56 K/UL (ref 1.5–8.1)
SODIUM BLD-SCNC: 140 MMOL/L (ref 135–144)
SPECIFIC GRAVITY UA: 1.01 (ref 1–1.03)
TRICHOMONAS: NORMAL
TROPONIN INTERP: NORMAL
TROPONIN INTERP: NORMAL
TROPONIN T: NORMAL NG/ML
TROPONIN T: NORMAL NG/ML
TROPONIN, HIGH SENSITIVITY: <6 NG/L (ref 0–14)
TROPONIN, HIGH SENSITIVITY: <6 NG/L (ref 0–14)
TURBIDITY: CLEAR
URINE HGB: NEGATIVE
UROBILINOGEN, URINE: NORMAL
WBC # BLD: 7.1 K/UL (ref 3.5–11.3)
WBC # BLD: NORMAL 10*3/UL
WBC UA: NORMAL /HPF (ref 0–5)
YEAST: NORMAL

## 2021-07-21 PROCEDURE — 71046 X-RAY EXAM CHEST 2 VIEWS: CPT

## 2021-07-21 PROCEDURE — 80048 BASIC METABOLIC PNL TOTAL CA: CPT

## 2021-07-21 PROCEDURE — 93005 ELECTROCARDIOGRAM TRACING: CPT | Performed by: STUDENT IN AN ORGANIZED HEALTH CARE EDUCATION/TRAINING PROGRAM

## 2021-07-21 PROCEDURE — 81001 URINALYSIS AUTO W/SCOPE: CPT

## 2021-07-21 PROCEDURE — 84484 ASSAY OF TROPONIN QUANT: CPT

## 2021-07-21 PROCEDURE — 70450 CT HEAD/BRAIN W/O DYE: CPT

## 2021-07-21 PROCEDURE — 85025 COMPLETE CBC W/AUTO DIFF WBC: CPT

## 2021-07-21 PROCEDURE — 99285 EMERGENCY DEPT VISIT HI MDM: CPT

## 2021-07-21 PROCEDURE — 83880 ASSAY OF NATRIURETIC PEPTIDE: CPT

## 2021-07-21 ASSESSMENT — PAIN SCALES - GENERAL: PAINLEVEL_OUTOF10: 8

## 2021-07-21 ASSESSMENT — ENCOUNTER SYMPTOMS
RHINORRHEA: 0
DIARRHEA: 0
SHORTNESS OF BREATH: 0
VOMITING: 0
ABDOMINAL PAIN: 0
BLOOD IN STOOL: 1
COUGH: 0
SORE THROAT: 0
NAUSEA: 0

## 2021-07-21 ASSESSMENT — HEART SCORE: ECG: 0

## 2021-07-21 ASSESSMENT — PAIN DESCRIPTION - LOCATION: LOCATION: HEAD

## 2021-07-21 NOTE — TELEPHONE ENCOUNTER
Spoke with patient and she states that her son is taking her to the Urgent Care on [facility], since that is closer to her house.

## 2021-07-21 NOTE — TELEPHONE ENCOUNTER
Received call from Sam Kerr at [facility] with [employer]. Patient calling to reschedule appointment from yesterday for dizziness. States that her appointment was scheduled at the other location and she is use to going to [facility]. Denies any new or worsening symptoms from yesterday when she spoke with another triage nurse. Recommended disposition was see in the office today. Advised if no appointments available, go to walk-in clinic today for evaluation. Advised also to callback to speak with nurse if any further questions or concerns. Writer provided warm transfer to Ita Kapadia at [facility] for appointment scheduling. Attention Provider: Thank you for allowing me to participate in the care of your patient. The patient was connected to triage in response to information provided to the ECC. Please do not respond through this encounter as the response is not directed to a shared pool.               Reason for Disposition   Caller has already spoken with another triager and has no further questions    Protocols used: NO CONTACT OR DUPLICATE CONTACT CALL-ADULT-[place]

## 2021-07-22 ENCOUNTER — TELEPHONE (OUTPATIENT)
Dept: FAMILY MEDICINE CLINIC | Age: 61
End: 2021-07-22

## 2021-07-22 DIAGNOSIS — Z76.0 MEDICATION REFILL: ICD-10-CM

## 2021-07-22 DIAGNOSIS — G89.4 CHRONIC PAIN SYNDROME: ICD-10-CM

## 2021-07-22 LAB
EKG ATRIAL RATE: 92 BPM
EKG P AXIS: 76 DEGREES
EKG P-R INTERVAL: 150 MS
EKG Q-T INTERVAL: 346 MS
EKG QRS DURATION: 94 MS
EKG QTC CALCULATION (BAZETT): 427 MS
EKG R AXIS: 80 DEGREES
EKG T AXIS: 71 DEGREES
EKG VENTRICULAR RATE: 92 BPM

## 2021-07-22 PROCEDURE — 93010 ELECTROCARDIOGRAM REPORT: CPT | Performed by: INTERNAL MEDICINE

## 2021-07-22 RX ORDER — PAROXETINE 10 MG/1
TABLET, FILM COATED ORAL
Qty: 30 TABLET | Refills: 5 | Status: SHIPPED | OUTPATIENT
Start: 2021-07-22

## 2021-07-22 RX ORDER — GABAPENTIN 400 MG/1
CAPSULE ORAL
Qty: 90 CAPSULE | Refills: 2 | OUTPATIENT
Start: 2021-07-22 | End: 2021-09-08

## 2021-07-22 RX ORDER — AMITRIPTYLINE HYDROCHLORIDE 25 MG/1
TABLET, FILM COATED ORAL
Qty: 30 TABLET | Refills: 5 | Status: SHIPPED | OUTPATIENT
Start: 2021-07-22

## 2021-07-22 RX ORDER — OMEPRAZOLE 20 MG/1
20 CAPSULE, DELAYED RELEASE ORAL DAILY
Qty: 30 CAPSULE | Refills: 5 | Status: SHIPPED | OUTPATIENT
Start: 2021-07-22

## 2021-07-22 NOTE — ED PROVIDER NOTES
[facility] ED  Emergency Department Encounter  Emergency Medicine Resident     Pt Name: Dago Browne  MRN: [id number]  Damiangfeliazar 1960  Date of evaluation: 7/21/21  PCP:  DARRIAN Acosta 4717       Chief Complaint   Patient presents with    Dizziness    Headache       HISTORY OFPRESENT ILLNESS  (Location/Symptom, Timing/Onset, Context/Setting, Quality, Duration, Modifying Factors,Severity.)      Dago Browne is a 56 y.o. female who presents with intermittent episodes of dizziness, headache for the past month. Last episode last night prior to going to bed. Describes the dizziness as \"heaviness\" and that she may pass out. Denies swaying, spinning/vertiginous feeling. Denies chest pain, shortness of breath, abdominal pain, nausea or vomiting, dysuria, fevers or chills, no URI symptoms. She does describe 2 separate episodes of bloody bowel movements with the last one most recently a week ago. PAST MEDICAL / SURGICAL / SOCIAL / FAMILY HISTORY      has a past medical history of Anxiety, Asthma, Carotid artery stenosis, COPD (chronic obstructive pulmonary disease) ([address] On home O2, Positive FIT (fecal immunochemical test), and Reflex sympathetic dystrophy of right upper extremity. has a past surgical history that includes Tonsillectomy; Hysterectomy; Vein Surgery (Right, 1997); vascular surgery; and Cardiac catheterization. Social:  reports that she has been smoking. She has a 17.50 pack-year smoking history. She has never used smokeless tobacco. She reports that she does not drink alcohol and does not use drugs. Family Hx:   Family History   Problem Relation Age of Onset    Heart Disease Other     High Blood Pressure Other     Diabetes Other     Asthma Other     Other Other         multi.  scler.     High Blood Pressure Mother     Diabetes Mother     Cancer Father         lung    Heart Attack Brother         age 61    Heart Attack APRN - CNP   vitamin B-12 (CYANOCOBALAMIN) 100 MCG tablet Take 0.5 tablets by mouth daily 12/29/20   Damon Shin, APRN - CNP   ipratropium-albuterol (DUONEB) 0.5-2.5 (3) MG/3ML SOLN nebulizer solution Inhale 3 mLs into the lungs three times daily 10/11/17   Damon Shin, APRN - CNP   acetaminophen (TYLENOL) 325 MG tablet Take 650 mg by mouth every 6 hours as needed for Pain Indications: Pain    Historical Provider, MD       REVIEW OFSYSTEMS    (2-9 systems for level 4, 10 or more for level 5)      Review of Systems   Constitutional: Negative for appetite change, chills, fatigue and fever. HENT: Negative for congestion, rhinorrhea, sneezing and sore throat. Eyes: Negative for visual disturbance. Respiratory: Negative for cough and shortness of breath. Cardiovascular: Negative for chest pain and leg swelling. Gastrointestinal: Positive for blood in stool. Negative for abdominal pain, diarrhea, nausea and vomiting. Genitourinary: Negative for dysuria and flank pain. Musculoskeletal: Negative for myalgias, neck pain and neck stiffness. Skin: Negative for rash and wound. Neurological: Positive for dizziness, light-headedness and headaches. Negative for syncope. Psychiatric/Behavioral: Negative for dysphoric mood and suicidal ideas. PHYSICAL EXAM   (up to 7 for level 4, 8 or more forlevel 5)      INITIAL VITALS:   Vitals:    07/21/21 2045   BP: 132/77   Pulse: 78   Resp: 18   Temp:    SpO2: 97%    /77   Pulse 78   Temp 98.2 °F (36.8 °C) (Oral)   Resp 18   Ht 5' 4\" (1.626 m)   Wt 143 lb (64.9 kg)   LMP  (LMP Unknown)   SpO2 97%   BMI 24.55 kg/m²       Physical Exam  Vitals and nursing note reviewed. Constitutional:       General: She is not in acute distress. Appearance: Normal appearance. She is not ill-appearing or diaphoretic. HENT:      Head: Normocephalic.       Nose: Nose normal.      Mouth/Throat:      Mouth: Mucous membranes are moist.      Pharynx: Oropharynx is clear. Eyes:      Extraocular Movements: Extraocular movements intact. Conjunctiva/sclera: Conjunctivae normal.      Pupils: Pupils are equal, round, and reactive to light. Cardiovascular:      Rate and Rhythm: Normal rate and regular rhythm. Pulses: Normal pulses. Heart sounds: Normal heart sounds. Pulmonary:      Effort: Pulmonary effort is normal. No respiratory distress. Breath sounds: Normal breath sounds. No wheezing or rales. Chest:      Chest wall: No tenderness. Abdominal:      General: There is no distension. Palpations: Abdomen is soft. Tenderness: There is no abdominal tenderness. There is no guarding or rebound. Musculoskeletal:         General: Normal range of motion. Cervical back: Normal range of motion and neck supple. Skin:     General: Skin is warm. Capillary Refill: Capillary refill takes less than 2 seconds. Neurological:      General: No focal deficit present. Mental Status: She is alert and oriented to person, place, and time. Psychiatric:         Mood and Affect: Mood normal.         Behavior: Behavior normal.         DIFFERENTIAL  DIAGNOSIS     Initial MDM/Plan: 56 y.o. female who presents with vague complaints of intermittent episodes of dizziness, chest tightness, headaches for the past month seem to occur when walking. She believes may be related to her Covid vaccine. No recent URI symptoms, fevers, chills, nausea, vomiting, dysuria or abdominal pain. She does describe 2 episodes of bloody bowel movements. Vital signs reviewed, within normal limits. On examination, she is very well-appearing, in no acute distress. No focal neurological deficits. Lungs are clear to auscultation bilaterally, heart is regular rate and rhythm with no murmurs rubs or gallops. Abdomen is soft, nondistended nontender. She has got 2+ radial and DP pulses. No pedal edema.   Plan for cardiac work-up, BMP, urinalysis and chest x-ray to evaluate for possible infectious cause of complaints. Also will obtain CT head given new onset of headaches. Anticipate observation admission given risk factors and complaint. DIAGNOSTIC RESULTS / EMERGENCYDEPARTMENT COURSE / MDM     LABS:  Labs Reviewed   BASIC METABOLIC PANEL - Abnormal; Notable for the following components:       Result Value    BUN 5 (*)     All other components within normal limits   CBC WITH AUTO DIFFERENTIAL   TROPONIN   BRAIN NATRIURETIC PEPTIDE   URINALYSIS WITH MICROSCOPIC   TROPONIN         RADIOLOGY:  XR CHEST (2 VW)    Result Date: 7/21/2021  EXAMINATION: TWO XRAY VIEWS OF THE CHEST 7/21/2021 9:08 pm COMPARISON: December 18, 2016 HISTORY: ORDERING SYSTEM PROVIDED HISTORY: dizziness, palpitations/chest tightness x1mo TECHNOLOGIST PROVIDED HISTORY: dizziness, palpitations/chest tightness x1mo FINDINGS: Lungs are hyperaerated but clear. Heart and mediastinum normal.  Bony thorax intact. COPD       EKG  EKG Interpretation    Interpreted by emergency department physician    Rhythm: normal sinus   Rate: normal  Axis: normal  Ectopy: none  Conduction: normal Qtc 427  ST Segments: no acute change  T Waves: no acute change  Q Waves: none    Clinical Impression: non-specific EKG    Lincoln Licona MD      All EKG's are interpreted by the Emergency Department Physicianwho either signs or Co-signs this chart in the absence of a cardiologist.    EMERGENCY DEPARTMENT COURSE:  ED Course as of Jul 21 2252 Wed Jul 21, 2021 2248 Discussed with patient overall unremarkable work-up and recommended admission to the observation unit due to her elevated heart score of 4 for further cardiac evaluation. Patient requesting to go home and return in the morning for reevaluation as she would like to go home and shower and sleep in her own bed. Patient does not wish to stay for CT head.   Discussed with patient the risks of leaving prior to termination of work-up and she understands these risks and wishes to go home anyway. She is of sound decision-making capacity. AMA forms will be signed. [JT]   5028 Patient would like to sign out against medical advice. I had an extensive discussion with pt regarding risks of leaving including, but not limited to worsening symptoms, permanent disability or death. Advised pt that they may return at any time for continued evaluation and treatment. Pt indicated understanding and acceptance of these risks and still wishes to sign out AMA. [JT]      ED Course User Index  [JT] Worth Leyden, MD         PROCEDURES:  None    CONSULTS:  None      FINAL IMPRESSION      1. Dizziness    2.  Chest pain, unspecified type          DISPOSITION / [address] [place] [address] 07/21/2021 10:39:53 PM        PATIENT REFERRED TO:  Sia Edmonds, APRN - CNP  [address]  [phone]    Schedule an appointment as soon as possible for a visit       [facility]  [address] [facility]  [phone]  Go to   If symptoms worsen      DISCHARGE MEDICATIONS:  New Prescriptions    No medications on file       Worth Leyden, MD  Emergency Medicine Resident    (Please note that portions of this note were completed with a voice recognition program.Efforts were made to edit the dictations but occasionally words are mis-transcribed.)        Worth Leyden, MD  Resident  07/21/21 1923

## 2021-07-22 NOTE — TELEPHONE ENCOUNTER
----- Message from Shannon Castellano sent at 7/22/2021 12:24 PM EDT -----  Subject: Medication Problem    QUESTIONS  Name of Medication? PARoxetine (PAXIL) 10 MG tablet  Patient-reported dosage and instructions? TAKE ONE TABLET BY MOUTH DAILY  What question or problem do you have with the medication? Pt is asking if   she should start back taking this medication. She says she went to Urgent   care on 7/21 and her BP was low so she wants to know what the PCP   recommends  Preferred Pharmacy? [facility] [address] [phone]  Pharmacy phone number (if available)? [phone]  Additional Information for Provider?   ---------------------------------------------------------------------------  --------------  CALL BACK INFO  What is the best way for the office to contact you? OK to leave message on   voicemail  Preferred Call Back Phone Number? [phone]  ---------------------------------------------------------------------------  --------------  SCRIPT ANSWERS  Relationship to Patient?  Self

## 2021-07-22 NOTE — ED PROVIDER NOTES
[facility] ED                                      Emergency Department                                      Faculty Attestation                                         I performed a history and physical examination of the patient and discussed management with the resident. I reviewed the residents note and agree with the documented findings and plan of care. Any areas of disagreement are noted on the chart. I was personally present for the key portions of any procedures. I have documented in the chart those procedures where I was not present during the key portions. I agree with the chief complaint, past medical history, past surgical history, allergies, medications, social and family history as documented unless otherwise noted below. For mid-level providers such as nurse practitioners as well as physicians assistants:    I have personally seen and evaluated the patient. I find the patient's history and physical exam are consistent with NP/PA documentation. I agree with the care provided, treatment rendered, disposition, & follow-up plan. Additional findings are as noted  78-year-old female for approximately 1-1/2 months has had episodes of feeling heavy, near syncopal, with some chest tightness. These tend to occur while walking. Patient also at times has a headache and dizziness. Patient last felt the symptoms last night. Over the past month patient has had 2 separate days worth of bloody stools. No associated abdominal pain. Vital signs acceptable. No apparent distress. Heart regular rate and rhythm. Lungs clear to all station bilaterally. Abdomen is soft, nontender, no mass, guarding, rebound, or peritoneal signs. No lower extremity edema or tenderness to palpation. .  Patient is neurologically intact      Onirodney Sosa, DO  07/21/21 2810

## 2021-07-22 NOTE — ED NOTES
Dr. Miller Lucas at bedside to explain risks of leaving before completion of care. PT verbalizes that she wants to go home and understands the risks. PT signed Hussein Eller paperwork. IV removed. PT informed to return at her convenience or if symptoms worsen.       Eneida Pritchard RN  07/21/21 3336

## 2021-07-22 NOTE — TELEPHONE ENCOUNTER
----- Message from Norberto Corneliusarniemary sent at 7/22/2021 12:24 PM EDT -----  Subject: Message to Provider    QUESTIONS  Information for Provider? Pt would like to ask that b/c of her BP and   heart she was told she shouldn't fly but she has a vacation to [place]   booked and paid for for 7/28 ( the flight is 1 hour) and she wants to know   if she is cleared to go and if she could get a prescription for motion   sickness. Pt would like someone from office to please call her back   regarding this matter  ---------------------------------------------------------------------------  --------------  CALL BACK INFO  What is the best way for the office to contact you? OK to leave message on   voicemail  Preferred Call Back Phone Number? [phone]  ---------------------------------------------------------------------------  --------------  SCRIPT ANSWERS  Relationship to Patient?  Self

## 2021-07-23 ENCOUNTER — TELEPHONE (OUTPATIENT)
Dept: FAMILY MEDICINE CLINIC | Age: 61
End: 2021-07-23

## 2021-07-23 NOTE — TELEPHONE ENCOUNTER
Karly Whipple was contacted as part of mammography outreach. Declined to schedule at this time. She will call after the first of the month.

## 2021-07-23 NOTE — TELEPHONE ENCOUNTER
Patient called office to follow up on request for clearance and was informed of provider's determination, patient states that she will call Roland RUIZ on Monday to schedule VV with provider to discuss this issue.

## 2021-07-23 NOTE — TELEPHONE ENCOUNTER
Patient returned call- said she completed work up and that she had CT and CXR completed on 7/21- Informed patient she missed appointment with Dr Clara Etienne and she said she was told her appointment was at the [facility] office.    Please advise

## 2021-11-29 DIAGNOSIS — Z76.0 MEDICATION REFILL: ICD-10-CM

## 2021-11-29 RX ORDER — TIOTROPIUM BROMIDE INHALATION SPRAY 3.12 UG/1
SPRAY, METERED RESPIRATORY (INHALATION)
OUTPATIENT
Start: 2021-11-29

## 2022-07-19 DIAGNOSIS — J30.9 ALLERGIC RHINITIS, UNSPECIFIED SEASONALITY, UNSPECIFIED TRIGGER: ICD-10-CM

## 2022-07-19 RX ORDER — FLUTICASONE PROPIONATE 50 MCG
SPRAY, SUSPENSION (ML) NASAL
OUTPATIENT
Start: 2022-07-19

## 2023-04-11 NOTE — TELEPHONE ENCOUNTER
----- Message from Betty Uribe sent at 7/22/2021 12:19 PM EDT -----  Subject: Refill Request    QUESTIONS  Name of Medication? gabapentin (NEURONTIN) 400 MG capsule  Patient-reported dosage and instructions? TAKE ONE CAPSULE BY MOUTH THREE   TIMES A DAY  How many days do you have left? 0  Preferred Pharmacy? [id number] phone number (if available)? [phone]  Additional Information for Provider? Pt requesting a 30 day supply  ---------------------------------------------------------------------------  --------------,  Name of Medication? amitriptyline (ELAVIL) 25 MG tablet  Patient-reported dosage and instructions? TAKE ONE TABLET BY MOUTH ONCE   NIGHTLY  How many days do you have left? 0  Preferred Pharmacy? [facility] phone number (if available)? [phone]  ---------------------------------------------------------------------------  --------------,  Name of Medication? omeprazole (PRILOSEC) 20 MG delayed release capsule  Patient-reported dosage and instructions? Take 1 capsule by mouth henrietta  How many days do you have left? 0  Preferred Pharmacy? [id number] phone number (if available)? [phone]  ---------------------------------------------------------------------------  --------------  CALL BACK INFO  What is the best way for the office to contact you? OK to leave message on   voicemail  Preferred Call Back Phone Number?  [phone] Tazorac Counseling:  Patient advised that medication is irritating and drying.  Patient may need to apply sparingly and wash off after an hour before eventually leaving it on overnight.  The patient verbalized understanding of the proper use and possible adverse effects of tazorac.  All of the patient's questions and concerns were addressed.